# Patient Record
Sex: MALE | Race: WHITE | Employment: OTHER | URBAN - NONMETROPOLITAN AREA
[De-identification: names, ages, dates, MRNs, and addresses within clinical notes are randomized per-mention and may not be internally consistent; named-entity substitution may affect disease eponyms.]

---

## 2021-02-08 ENCOUNTER — HOSPITAL ENCOUNTER (OUTPATIENT)
Dept: PHYSICAL THERAPY | Age: 71
Discharge: HOME | End: 2021-02-08
Payer: MEDICARE

## 2021-02-08 PROCEDURE — 97161 PT EVAL LOW COMPLEX 20 MIN: CPT

## 2021-02-08 NOTE — PROGRESS NOTES
PT INITIAL EVALUATION NOTE - [facility] 2-15    Patient Name: Akilah Das  Date:2021  : 1950  [x]  Patient  Verified  Payor: [facility] MEDICARE / Plan: [address] / Product Type: Managed Care Medicare /    In time: 2:14  Out time: 3:09  Total Treatment Time (min): 55  Total Timed Codes (min): 0  1:1 Treatment Time ( [address] only): 54   Visit #: 1    Treatment Area: Low back pain [M54.5]    SUBJECTIVE  Pain Level (0-10 scale): 3  Any medication changes, allergies to medications, adverse drug reactions, diagnosis change, or new procedure performed?: [] No    [x] Yes (see summary sheet for update)  Subjective:    A couple weeks before , pt complains of pain in L LE around his knee. He states he first noticed the pain when they were helping move furniture. Pt went to family doctor and was treated for sciatica. Pt states the pain traveled from knee to hip then below knee. Since , \"pain would continue to move\". X-rays were taken and then pt saw orthopedic surgeon. The pt states the orthopedist stated he may have DDD of L4-L5 and L5-S1. MD gave pt a list of initial exercises, but pt states pain increased on 3rd day of doing exercises. 2 weeks ago, pt states he was unable to bend forward and put socks on. Recently, pt states pain has been improving. He states he had no pain over the weekend. PLOF: Pt was active and pain free before . Mechanism of Injury: Moving heavy objects. Radiographs: X-rays of L hip and spine. What increases symptoms: Standing for prolonged periods, walking quickly. What decreases symptoms: no pain in sitting and non-weightbearing. PMHx/Surgical Hx: Carpal tunnel, hernia repair, prostate  Work Hx: Retired currently,   Living Situation: 1 story, 4 steps to enter, B railing. Pt Goals:  To be free from pain  Barriers: None  Motivation: good   Substance use: none noted   Cognition: A & O x 4    Fall Assessment: none needed       OBJECTIVE/EXAMINATION  Posture:  Sitting comfortably edge of bed with no apparent complaints of pain or discomfort. Other Observations:    Gait and Functional Mobility:  No significant observations  Palpation: No tenderness noted upon palpation along pt's tibial tuberosity, where pt stated was area with typical pain. Flexibility: 90/90 Hamstring flexibility - -27 degrees B    Neurological: Reflexes / Sensations: Patellar reflexes symmetrical w/ Jendrassik maneuver.             Lumbar AROM:      Flexion             45 degrees      Extension            25 degrees                     R                    L  Side Bending   10 degrees  10 degrees    Rotation   35 degrees  35 degrees      Manual Muscle Testing  Hip Flexion                     5/5                  5/5  Knee Extension             5/5                    5/5  Knee Flexion                 5/5  4+/5  Ankle PF    5/5  5/5  Ankle DF    5/5  5/5        Special Tests:    Slump: -.       Pain Level (0-10 scale) post treatment: 0/10    ASSESSMENT/Changes in Function:     [x]  See Plan of [address] Francoise Coronado PT, DPT 2/8/2021

## 2021-02-08 NOTE — PROGRESS NOTES
[address], [address] [place]  Ph: [phone]    Fax: [phone]    Plan of Care/Statement of Necessity for Physical Therapy Services  2-15    Patient name: Shirin Martin  : 1950  Provider#: [id number]  Referral source: Jannette Melgoza MD      Medical/Treatment Diagnosis: Low back pain [M54.5]     Prior Hospitalization: see medical history     Comorbidities: see medical history  Prior Level of Function: Pt was an unlimited community ambulator  Medications: Verified on Patient Summary List  Start of Care: 2021      Onset Date: 2020   The [address] [place] [address] and following information is based on the information from the initial evaluation. Assessment/ key information: Pt is a 65year old male that presents with signs and symptoms likely consistent with low back dysfunction. Pt is a good candidate for rehab as he is a healthy and active individual who is motivated to return to his PLOF. He would benefit from skilled OP PT to address the stated deficits below and return to his PLOF. Evaluation Complexity History LOW Complexity : Zero comorbidities / personal factors that will impact the outcome / POC; Examination LOW Complexity : 1-2 Standardized tests and measures addressing body structure, function, activity limitation and / or participation in recreation  ;Presentation LOW Complexity : Stable, uncomplicated  ;   Overall Complexity Rating: LOW     Problem List: pain affecting function, decrease strength and decrease activity tolerance   Treatment Plan may include any combination of the following: Therapeutic exercise, Therapeutic activities, Neuromuscular re-education, Physical agent/modality, Gait/balance training, Manual therapy and Patient education  Patient / Family readiness to learn indicated by: asking questions  Persons(s) to be included in education: patient (P)  Barriers to Learning/Limitations: None  Patient Goal (s): I want to be free from this pain  Patient Self Reported Health Status: good  Rehabilitation Potential: good    Short Term Goals: To be accomplished in 6 treatments:  1) Pt will be able to stand for 30 minutes or longer with a pain rating of 5/10 or less in order to safely perform ADLs. 2) Pt will be independent with HEP to continue rehab outside of therapy. Long Term Goals: To be accomplished in 12 treatments:  1) Pt will be able to ambulate for 10 minutes without pain to safely navigate his community. 2) Pt will demonstrate 5/5 L knee flexion MMT to be able to safely move furniture. 3) Pt will report no radiating symptoms in a span of 2 days or greater to demonstrate return to PLOF. Frequency / Duration: Patient to be seen 2 times per week for 12 treatments. Patient/ Caregiver education and instruction: self care    [x]  Plan of care has been reviewed with PTA        Certification Period: 2021 to 2021    Dmitriy Yin PT, DPT 2021     ________________________________________________________________________    I certify that the above Therapy Services are being furnished while the patient is under my care. I agree with the treatment plan and certify that this therapy is necessary.     Physician's Signature:____________________  Date:____________Time: _________    Patient name: Pascual Stafford  : 1950  Provider#: [id number]

## 2021-02-16 ENCOUNTER — HOSPITAL ENCOUNTER (OUTPATIENT)
Dept: PHYSICAL THERAPY | Age: 71
Discharge: HOME | End: 2021-02-16
Payer: MEDICARE

## 2021-02-16 PROCEDURE — 97014 ELECTRIC STIMULATION THERAPY: CPT

## 2021-02-16 PROCEDURE — 97110 THERAPEUTIC EXERCISES: CPT

## 2021-02-16 NOTE — PROGRESS NOTES
PT DAILY TREATMENT NOTE 2-15    Patient Name: Ravi Lugo  Date:2021  : 1950  [x]  Patient  Verified  Payor: [facility] MEDICARE / Plan: [address] / Product Type: [employer] Medicare /    In time:904  Out time:  Total Treatment Time (min): 71  Visit #: 2    Treatment Area: Low back pain [M54.5]    SUBJECTIVE  Pain Level (0-10 scale): 2/10  Any medication changes, allergies to medications, adverse drug reactions, diagnosis change, or new procedure performed?: [x] No    [] Yes (see summary sheet for update)  Subjective functional status/changes:   [] No changes reported  \"I have good days and I have bad days. It hurts so bad to walk for a long time in [employer]. \"    OBJECTIVE    Modality rationale: decrease edema, decrease inflammation, decrease pain, increase tissue extensibility and increase muscle contraction/control to improve the patients ability to complete adl's without pain   Min Type Additional Details      15 [x] Estim: []Att   []Unatt    []TENS instruct                  [x]IFC  []Premod   []NMES                     []Other:  []w/US   []w/ice   [x]w/heat  Position: seated  Location: lower back       []  Traction: [] Cervical       []Lumbar                       [] Prone          []Supine                       []Intermittent   []Continuous Lbs:  [] before manual  [] after manual  []w/heat    []  Ultrasound: []Continuous   [] Pulsed                       at: []1MHz   []3MHz Location:  W/cm2:    [] Paraffin         Location:   []w/heat    []  Ice     []  Heat  []  Ice massage Position:  Location:    []  Laser  []  Other: Position:  Location:      []  Vasopneumatic Device Pressure:       [] lo [] med [] hi   [] w/ ice    Temperature:      [x] Skin assessment post-treatment:  [x]intact []redness- no adverse reaction    []redness  adverse reaction:     55 min Therapeutic Exercise:  [x] See flow sheet :   Rationale: increase ROM, increase strength and improve coordination to improve the patients ability to complete ADLs without pain. With   [x] TE   [] TA   [] neuro   [] other: Patient Education: [] Review HEP    [] Progressed/Changed HEP based on:   [] positioning   [] body mechanics   [] transfers   [] heat/ice application    [x] other: provided HEP     Other Objective/Functional Measures: addition of seated and supine lumbar arom and stretching     Pain Level (0-10 scale) post treatment: 0/10    ASSESSMENT/Changes in Function:   Pt tolerated treatment well. Pt educated on condition and different lumbar positions which may alleviate or increase pain due to nerve compression. Discussion over mechanical traction- may attempt more conservative treatment initially, and if symptoms do not improve, can add in this treatment option. Patient will continue to benefit from skilled PT services to modify and progress therapeutic interventions, address functional mobility deficits, address ROM deficits, address strength deficits, analyze and address soft tissue restrictions, analyze and cue movement patterns and analyze and modify body mechanics/ergonomics to attain remaining goals. [x]  See Plan of Care  []  See progress note/recertification  []  See Discharge Summary         Progress towards goals / Updated goals:  Short Term Goals: To be accomplished in 6 treatments:  1) Pt will be able to stand for 30 minutes or longer with a pain rating of 5/10 or less in order to safely perform ADLs. 2) Pt will be independent with HEP to continue rehab outside of therapy.      Long Term Goals: To be accomplished in 12 treatments:  1) Pt will be able to ambulate for 10 minutes without pain to safely navigate his community. 2) Pt will demonstrate 5/5 L knee flexion MMT to be able to safely move furniture.    3) Pt will report no radiating symptoms in a span of 2 days or greater to demonstrate return to PLOF.        PLAN  [x]  Upgrade activities as tolerated     [x] Continue plan of care  []  Update interventions per flow sheet       []  Discharge due to:_  []  Other:_      Marlena Guzman, PT, DPT 2/16/2021

## 2021-02-18 ENCOUNTER — APPOINTMENT (OUTPATIENT)
Dept: PHYSICAL THERAPY | Age: 71
End: 2021-02-18
Payer: MEDICARE

## 2021-02-23 ENCOUNTER — HOSPITAL ENCOUNTER (OUTPATIENT)
Dept: PHYSICAL THERAPY | Age: 71
Discharge: HOME | End: 2021-02-23
Payer: MEDICARE

## 2021-02-23 PROCEDURE — 97012 MECHANICAL TRACTION THERAPY: CPT

## 2021-02-23 PROCEDURE — 97110 THERAPEUTIC EXERCISES: CPT

## 2021-02-23 PROCEDURE — 97014 ELECTRIC STIMULATION THERAPY: CPT

## 2021-02-23 NOTE — PROGRESS NOTES
PT DAILY TREATMENT NOTE - [facility] 2-15    Patient Name: Park Jonhson  Date:2021  : 1950  [x]  Patient  Verified  Payor: [employer] MEDICARE / Plan: [employer] / Product Type: Managed Care Medicare /    In time:958 am Out time:1103 am  Total Treatment Time (min): 65  Total Timed Codes (min): 50  1:1 Treatment Time ( only): *50  Visit #: 3  Treatment Area: Low back pain [M54.5]    SUBJECTIVE  Pain Level (0-10 scale): 4/10  Any medication changes, allergies to medications, adverse drug reactions, diagnosis change, or new procedure performed?: [x] No    [] Yes (see summary sheet for update)  Subjective functional status/changes:   [] No changes reported  \"Pt reports still having pain in his back with forward bending and increased walking.  Pt also notes that he has been pushing ex to point of .\"    OBJECTIVE    Modality rationale: decrease pain and increase tissue extensibility to improve the patients ability to increase active motion in trunk and hips     Min Type Additional Details      15 [x] Estim: [x]Att   []Unatt    []TENS instruct                  [x]IFC  []Premod   []NMES                    []Other:  []w/US      [x]w/ heat  []w/ ice  Position: supine w/ traction   Location: L LB hip       15 [x]  Traction: [] Cervical       [x]Lumbar                       [] Prone          [x]Supine                       [x]Intermittent   []Continuous Lbs: 45/23  [] before manual  [] after manual  [x] w/ heat  [x] Simultaneously performed with w/ Estim    []  Ultrasound: []Continuous   [] Pulsed                       at: []1MHz   []3MHz Location:  W/cm2:    [] Paraffin         Location:   []w/heat   15 []  Ice     [x]  Heat  []  Ice massage Position: supine   Location: across LB w/ ES and TX    []  Laser  []  Other: Position:  Location:      []  Vasopneumatic Device Pressure:       [] lo [] med [] hi   [] w/ ice      Temperature:   [] Simultaneously performed with w/ Estim [x] Skin assessment post-treatment:  [x]intact []redness- no adverse reaction     []redness  adverse reaction:     50 min Therapeutic Exercise:  [x] See flow sheet :   Rationale: increase ROM, increase strength and improve coordination to improve the patients ability to increase functional motion with no pain in Legs and back during WB and bending . With   [] TE   [] TA   [] neuro   [] other: Patient Education: [x] Review HEP    [] Progressed/Changed HEP based on:   [] positioning   [] body mechanics   [] transfers   [] heat/ice application    [] other:          Pain Level (0-10 scale) post treatment: 0/10    ASSESSMENT/Changes in Function:   The pt tolerated treatment session with the addition of motilities, traction with e-stim and MHP. Pt tolerated all ex with no c/o and instruction to ease up pressure to feel stretch not hurt with stretch. Pt notes compliance with HEP however doing too much and instructed to reduce ex. Patient will continue to benefit from skilled PT services to modify and progress therapeutic interventions, address functional mobility deficits, address ROM deficits, address strength deficits, analyze and address soft tissue restrictions, analyze and cue movement patterns and analyze and modify body mechanics/ergonomics to attain remaining goals     [x]  See Plan of Care  []  See progress note/recertification  []  See Discharge Summary         Progress towards goals / Updated goals:  Short Term Goals: To be accomplished in 6 treatments:  1) Pt will be able to stand for 30 minutes or longer with a pain rating of 5/10 or less in order to safely perform ADLs. 2) Pt will be independent with HEP to continue rehab outside of therapy.      Long Term Goals: To be accomplished in 12 treatments:  1) Pt will be able to ambulate for 10 minutes without pain to safely navigate his community. 2) Pt will demonstrate 5/5 L knee flexion MMT to be able to safely move furniture.    3) Pt will report no radiating symptoms in a span of 2 days or greater to demonstrate return to PLOF. PLAN  [x]  Upgrade activities as tolerated     [x]  Continue plan of care  []  Update interventions per flow sheet       []  Discharge due to:_  []  Other:_      Kecia Noriega 2/23/2021

## 2021-02-25 ENCOUNTER — HOSPITAL ENCOUNTER (OUTPATIENT)
Dept: PHYSICAL THERAPY | Age: 71
Discharge: HOME | End: 2021-02-25
Payer: MEDICARE

## 2021-02-25 PROCEDURE — 97110 THERAPEUTIC EXERCISES: CPT

## 2021-02-25 PROCEDURE — 97014 ELECTRIC STIMULATION THERAPY: CPT

## 2021-02-25 PROCEDURE — 97012 MECHANICAL TRACTION THERAPY: CPT

## 2021-02-25 NOTE — PROGRESS NOTES
PT DAILY TREATMENT NOTE - [facility] 2-15    Patient Name: Nadya Arthur  Date:2021  : 1950  [x]  Patient  Verified  Payor: [facility] MEDICARE / Plan: [address] / Product Type: Managed Care Medicare /    In time:9:51  Out time:11:00  Total Treatment Time (min): 69  Total Timed Codes (min): 49  1:1 Treatment Time ( [address] only): 71   Visit #:  4    Treatment Area: Low back pain [M54.5]    SUBJECTIVE  Pain Level (0-10 scale): 2/10  Any medication changes, allergies to medications, adverse drug reactions, diagnosis change, or new procedure performed?: [x] No    [] Yes (see summary sheet for update)  Subjective functional status/changes:   [] No changes reported  \"I think traction helped last time. I was able to walk all the way to my car without limping. I'm not where I want to be yet but I'm definitely noticing improvement. \"    OBJECTIVE    Modality rationale: decrease pain to improve the patients ability to ambulate within his community   Min Type Additional Details      20 [x] Estim: []Att   [x]Unatt    []TENS instruct                  [x]IFC  []Premod   []NMES                    []Other:  []w/US      [x]w/ heat  []w/ ice  Position: supine  Location: low back      20 [x]  Traction: [] Cervical       [x]Lumbar                       [] Prone          []Supine                       []Intermittent   []Continuous Lbs: 50/25  [] before manual  [] after manual  [x] w/ heat  [x] Simultaneously performed with w/ Estim    []  Ultrasound: []Continuous   [] Pulsed                       at: []1MHz   []3MHz Location:  W/cm2:    [] Paraffin         Location:   []w/heat    []  Ice     []  Heat  []  Ice massage Position:  Location:    []  Laser  []  Other: Position:  Location:      []  Vasopneumatic Device Pressure:       [] lo [] med [] hi   [] w/ ice      Temperature:   [] Simultaneously performed with w/ Estim     [x] Skin assessment post-treatment:  [x]intact []redness- no adverse reaction     []redness  adverse reaction:     49 min Therapeutic Exercise:  [] See flow sheet :   Rationale: increase ROM and increase strength to improve the patients ability to ambulate without pain    Other Objective/Functional Measures:   Instructed pt in Paloff Press to increase isometric core strength. Instructed pt in leg press to improve strength and safety with squatting. Pain Level (0-10 scale) post treatment: 0/10    ASSESSMENT/Changes in Function:   The pt tolerated treatment well. He demonstrates a good understanding of exercises and requires minimal VC to perform them properly. He continues to demonstrate hamstring and glute tightness during stretches and also complains of radiating pain during figure 4 stretch on the L LE. Pt reports good results from initial traction at previous treatment therefore traction weight parameters were increased slightly to progress. Pt can continue to progress in exercises and resistance as tolerated. Patient will continue to benefit from skilled PT services to modify and progress therapeutic interventions, address functional mobility deficits, address ROM deficits, address strength deficits, analyze and modify body mechanics/ergonomics and assess and modify postural abnormalities to attain remaining goals     [x]  See Plan of Care  []  See progress note/recertification  []  See Discharge Summary         Progress towards goals / Updated goals:  Short Term Goals: To be accomplished in 6 treatments:  1) Pt will be able to stand for 30 minutes or longer with a pain rating of 5/10 or less in order to safely perform ADLs. In progress  2) Pt will be independent with HEP to continue rehab outside of therapy. In [address] be accomplished in 12 treatments:  1) Pt will be able to ambulate for 10 minutes without pain to safely navigate his community. In Progress   2) Pt will demonstrate 5/5 L knee flexion MMT to be able to safely move furniture.  In Progress  3) Pt will report no radiating symptoms in a span of 2 days or greater to demonstrate return to PLOF.  In Progress      PLAN  [x]  Upgrade activities as tolerated     [x]  Continue plan of care  [x]  Update interventions per flow sheet       []  Discharge due to:_  []  Other:_      Daryl Blair, PT, DPT 2/25/2021

## 2021-03-02 ENCOUNTER — HOSPITAL ENCOUNTER (OUTPATIENT)
Dept: PHYSICAL THERAPY | Age: 71
Discharge: HOME | End: 2021-03-02
Payer: MEDICARE

## 2021-03-02 PROCEDURE — 97110 THERAPEUTIC EXERCISES: CPT

## 2021-03-02 PROCEDURE — 97014 ELECTRIC STIMULATION THERAPY: CPT

## 2021-03-02 PROCEDURE — 97012 MECHANICAL TRACTION THERAPY: CPT

## 2021-03-02 NOTE — PROGRESS NOTES
PT DAILY TREATMENT NOTE - [facility] 2-15    Patient Name: Lion Avila  Date:3/2/2021  : 1950  [x]  Patient  Verified  Payor: [place] [employer] MEDICARE / Plan: [employer] / Product Type: Managed Care Medicare /    In time: 9:54  Out time:11:00  Total Treatment Time (min): 66  Total Timed Codes (min): 48  1:1 Treatment Time ( [address] only): 48   Visit #: 5    Treatment Area: Low back pain [M54.5]    SUBJECTIVE  Pain Level (0-10 scale): 1/10  Any medication changes, allergies to medications, adverse drug reactions, diagnosis change, or new procedure performed?: [x] No    [] Yes (see summary sheet for update)  Subjective functional status/changes:   [] No changes reported  Pt reports he notices an improvement in his pain, but continues to have issues with standing and walking for extended periods of time. OBJECTIVE    Modality rationale: decrease pain and increase tissue extensibility to improve the patients ability to perform ADLs with reduced pain.    Min Type Additional Details      18 [x] Estim: []Att   [x]Unatt    []TENS instruct                  [x]IFC  []Premod   []NMES                    []Other:  []w/US      [x]w/ heat  []w/ ice  Position: Supine  Location: Over lumbar region  (Performed simultaneously with mechanical traction)      18 [x]  Traction: [] Cervical       [x]Lumbar                       [] Prone          [x]Supine                       [x]Intermittent   []Continuous Lbs: 60# max/30 # min  [] before manual  [] after manual  [x] w/ heat  [x] Simultaneously performed with w/ Estim    []  Ultrasound: []Continuous   [] Pulsed                       at: []1MHz   []3MHz Location:  W/cm2:    [] Paraffin         Location:   []w/heat   18 []  Ice     [x]  Heat  []  Ice massage Position: Supine  Location: Over lumbar region    []  Laser  []  Other: Position:  Location:      []  Vasopneumatic Device Pressure:       [] lo [] med [] hi   [] w/ ice      Temperature:   [] Simultaneously performed with w/ Estim     [x] Skin assessment post-treatment:  [x]intact [x]redness- no adverse reaction     []redness - adverse reaction:     48 min Therapeutic Exercise:  [x] See flow sheet :   Rationale: increase ROM and increase strength to improve the patient’s ability to perform ADLs.    With   [x] TE   [] TA   [] neuro   [] other: Patient Education: [x] Review HEP    [] Progressed/Changed HEP based on:   [] positioning   [] body mechanics   [] transfers   [] heat/ice application    [] other:      Other Objective/Functional Measures: Increased max and min weight for mechanical traction to facilitate further pain relief. Added TA draw for further progression with core stability.    Pain Level (0-10 scale) post treatment: 0/10    ASSESSMENT/Changes in Function:   The pt tolerated treatment well. Pt is steadily progressing with flexibility and core stability exercises, but continues to be limited by pain with prolonged standing and walking. Pt requires verbal cueing for proper performance of exercises and education to prevent overstretching. Continue to progress as able. Patient will continue to benefit from skilled PT services to modify and progress therapeutic interventions, address functional mobility deficits, address ROM deficits, address strength deficits, analyze and address soft tissue restrictions, analyze and cue movement patterns, analyze and modify body mechanics/ergonomics and assess and modify postural abnormalities to attain remaining goals     [x]  See Plan of Care  []  See progress note/recertification  []  See Discharge Summary         Progress towards goals / Updated goals:  Short Term Goals: To be accomplished in 6 treatments:  1) Pt will be able to stand for 30 minutes or longer with a pain rating of 5/10 or less in order to safely perform ADLs. In progress  2) Pt will be independent with HEP to continue rehab outside of therapy. In Progress     Long Term Goals: To be  accomplished in 12 treatments:  1) Pt will be able to ambulate for 10 minutes without pain to safely navigate his community. In Progress   2) Pt will demonstrate 5/5 L knee flexion MMT to be able to safely move furniture. In Progress  3) Pt will report no radiating symptoms in a span of 2 days or greater to demonstrate return to PLOF.  In Progress      PLAN  [x]  Upgrade activities as tolerated     [x]  Continue plan of care  []  Update interventions per flow sheet       []  Discharge due to:_  []  Other:_      Jacobo Goodrich, PT, DPT 3/2/2021

## 2021-03-04 ENCOUNTER — HOSPITAL ENCOUNTER (OUTPATIENT)
Dept: PHYSICAL THERAPY | Age: 71
Discharge: HOME | End: 2021-03-04
Payer: MEDICARE

## 2021-03-04 PROCEDURE — 97012 MECHANICAL TRACTION THERAPY: CPT

## 2021-03-04 PROCEDURE — 97014 ELECTRIC STIMULATION THERAPY: CPT

## 2021-03-04 PROCEDURE — 97110 THERAPEUTIC EXERCISES: CPT

## 2021-03-04 NOTE — PROGRESS NOTES
PT DAILY TREATMENT NOTE - [facility] 2-15    Patient Name: Lion Avila  Date:3/4/2021  : 1950  [x]  Patient  Verified  Payor: [employer]ga [employer] / Plan: [employer] / Product Type: Managed Care Medicare /    In time: 5  Out time: 1110  Total Treatment Time (min):  78  Total Timed Codes (min): 78  1:1 Treatment Time ( only): 78   Visit #: 6    Treatment Area: Low back pain [M54.5]    SUBJECTIVE  Pain Level (0-10 scale): 1/10  Any medication changes, allergies to medications, adverse drug reactions, diagnosis change, or new procedure performed?: [x] No    [] Yes (see summary sheet for update)  Subjective functional status/changes:   [] No changes reported  \"My symptoms have definitely improved since I started coming, but I still have soreness. I can walk for longer periods and can go shopping, but I do still feel it in my legs. I do feel something \"not right\" in my legs the more I walk. \"    OBJECTIVE  Posture:  Sitting comfortably edge of bed with no apparent complaints of pain or discomfort.   Gait and Functional Mobility:  No significant observations  Palpation: No tenderness noted upon palpation along pt's tibial tuberosity, where pt stated was area with typical pain.     Flexibility: 90/90 Hamstring flexibility -35L;  -40R degrees                                                           Lumbar AROM:                                               Flexion                                              70 degrees                                               Extension                                         25 degrees                                                                                                      R                                 L  Side Bending                           20 degrees                  25 degrees                    Rotation                                   30 degrees                  40 degrees                       Manual Muscle Testing  Hip Flexion                                          5/5                  5/5  Knee Extension                                  5/5                    5/5  Knee Flexion                                       5/5                   5/5  Ankle PF                                             5/5                   5/5  Ankle DF                                             5/5                   5/5                                                    Special Tests:               Slump: -.         Modality rationale: decrease pain and increase tissue extensibility to improve the patients ability to perform ADLs with reduced pain. Min Type Additional Details      18 [x] Estim: []Att   [x]Unatt    []TENS instruct                  [x]IFC  []Premod   []NMES                    []Other:  []w/US      [x]w/ heat  []w/ ice  Position: Supine  Location: Over lumbar region  (Performed simultaneously with mechanical traction)      18 [x]  Traction: [] Cervical       [x]Lumbar                       [] Prone          [x]Supine                       [x]Intermittent   []Continuous Lbs: 60# max/30 # min  [] before manual  [] after manual  [x] w/ heat  [x] Simultaneously performed with w/ Estim    []  Ultrasound: []Continuous   [] Pulsed                       at: []1MHz   []3MHz Location:  W/cm2:    [] Paraffin         Location:   []w/heat   18 []  Ice     [x]  Heat  []  Ice massage Position: Supine  Location: Over lumbar region    []  Laser  []  Other: Position:  Location:      []  Vasopneumatic Device Pressure:       [] lo [] med [] hi   [] w/ ice      Temperature:   [] Simultaneously performed with w/ Estim     [x] Skin assessment post-treatment:  [x]intact [x]redness- no adverse reaction     []redness - adverse reaction:     48 min Therapeutic Exercise:  [x] See flow sheet :   Rationale: increase ROM and increase strength to improve the patients ability to perform ADLs.     With   [x] TE   [] TA   [] neuro   [] other: Patient Education: [x] Review HEP    [] Progressed/Changed HEP based on:   [] positioning   [] body mechanics   [] transfers   [] heat/ice application    [] other:      Other Objective/Functional Measures: No change in mechanical traction today due to pt increased pain post last treatment session. Pain Level (0-10 scale) post treatment: 0/10    ASSESSMENT/Changes in Function:   Pt has received treatment including lumbopelvic and lower extremity flexibility, ROM, strengthening, pelvic and core stabilization, functional activities, and education in posture and body mechanics. Pt has demonstrated overall progress in TUG test, rom, strength, pain level, and functional activites. Pt continues to present with functional deficits including radiation of symptoms and functional deficits. Pt would benefit from further skilled physical therapy interventions to continue to progress range of motion, strength, and balance, allowing for improved function. The pt tolerated treatment well. Pt is steadily progressing with flexibility and core stability exercises, but continues to be limited by pain with prolonged standing and walking. Pt requires verbal cueing for proper performance of exercises and education to prevent overstretching. Continue to progress as able.  Patient will continue to benefit from skilled PT services to modify and progress therapeutic interventions, address functional mobility deficits, address ROM deficits, address strength deficits, analyze and address soft tissue restrictions, analyze and cue movement patterns, analyze and modify body mechanics/ergonomics and assess and modify postural abnormalities to attain remaining goals     [x]  See Plan of Care  []  See progress note/recertification  []  See Discharge Summary         Progress towards goals / Updated goals:  Short Term Goals: To be accomplished in 6 treatments:  1) Pt will be able to stand for 30 minutes or longer with a pain rating of 5/10 or less in order to safely perform ADLs. Met  2) Pt will be independent with HEP to continue rehab outside of therapy. Met     Long Term Goals: To be accomplished in 12 treatments:  1) Pt will be able to ambulate for 10 minutes without pain to safely navigate his community. Met   2) Pt will demonstrate 5/5 L knee flexion MMT to be able to safely move furniture. Met  3) Pt will report no radiating symptoms in a span of 2 days or greater to demonstrate return to PLOF.  In Progress      PLAN  [x]  Upgrade activities as tolerated     [x]  Continue plan of care  []  Update interventions per flow sheet       []  Discharge due to:_  []  Other:_      Aren Silverman, PT, DPT 3/4/2021

## 2021-03-04 NOTE — PROGRESS NOTES
[address]  [place], [place]  Ph: [phone]    Fax: [phone]    Progress Note    Name: Lubna Rivers   : 1950   MD: Chris Carballo MD       Treatment Diagnosis: Low back pain [M54.5]  Start of Care: 2021    Visits from Start of Care: 6   Missed Visits: 0    Summary of Care / Assessment / Recommendations:   Pt has received treatment including lumbopelvic and lower extremity flexibility, ROM, strengthening, pelvic and core stabilization, functional activities, and education in posture and body mechanics. Pt has demonstrated overall progress in TUG test, rom, strength, pain level, and functional activites. Pt continues to present with functional deficits including radiation of symptoms and functional deficits. Pt would benefit from further skilled physical therapy interventions to continue to progress range of motion, strength, and balance, allowing for improved function. Progress towards goals / Updated goals:  Short Term Goals: To be accomplished in 6 treatments:  1) Pt will be able to stand for 30 minutes or longer with a pain rating of 5/10 or less in order to safely perform ADLs. Met  2) Pt will be independent with HEP to continue rehab outside of therapy. Met     Long Term Goals: To be accomplished in 12 treatments:  1) Pt will be able to ambulate for 10 minutes without pain to safely navigate his community. Met   2) Pt will demonstrate 5/5 L knee flexion MMT to be able to safely move furniture. Met  3) Pt will report no radiating symptoms in a span of 2 days or greater to demonstrate return to PLOF. In Progress     Frequency/Duration:  2 treatments per week, for 5 weeks. Re Kenyon, PT, DPT 3/4/2021         ________________________________________________________________________  NOTE TO PHYSICIAN:  Please complete the following and fax to:  [facility]:   Fax: [phone]  .  Retain this original for your records. If you are unable to process this request in 24 hours, please contact our office.        ____ I have read the above report and request that my patient continue therapy with the following changes/special instructions:  ____ I have read the above report and request that my patient be discharged from therapy    Physician's Signature:_________________ Date:___________Time:__________

## 2021-03-09 ENCOUNTER — HOSPITAL ENCOUNTER (OUTPATIENT)
Dept: PHYSICAL THERAPY | Age: 71
Discharge: HOME | End: 2021-03-09
Payer: MEDICARE

## 2021-03-09 PROCEDURE — 97014 ELECTRIC STIMULATION THERAPY: CPT

## 2021-03-09 PROCEDURE — 97012 MECHANICAL TRACTION THERAPY: CPT

## 2021-03-09 PROCEDURE — 97110 THERAPEUTIC EXERCISES: CPT

## 2021-03-09 NOTE — PROGRESS NOTES
PT DAILY TREATMENT NOTE - [facility] -15    Patient Name: Cristian Vargas  Date:3/9/2021  : 1950  [x]  Patient  Verified  Payor: [place] [employer] MEDICARE / Plan: [employer] / Product Type: Managed Care Medicare /    In time:9:53  Out time:10:58  Total Treatment Time (min): 65  Total Timed Codes (min): 65  1:1 Treatment Time ([facility] only): 65   Visit #: 7    Treatment Area: Low back pain [M54.5]    SUBJECTIVE  Pain Level (0-10 scale): 0/10  Any medication changes, allergies to medications, adverse drug reactions, diagnosis change, or new procedure performed?: [x] No    [] Yes (see summary sheet for update)  Subjective functional status/changes:   [] No changes reported  Pt reports he is not having any pain, but his biggest issue right now is walking for more than 10 minutes before his legs feel \"tired. \"    OBJECTIVE    Modality rationale: decrease pain and increase tissue extensibility to improve the patients ability to perform ADLs with reduced pain.    Min Type Additional Details      18 [x] Estim: []Att   [x]Unatt    []TENS instruct                  [x]IFC  []Premod   []NMES                    []Other:  []w/US      [x]w/ heat  []w/ ice  Position: Supine  Location: Over lumbar region      18 [x]  Traction: [] Cervical       [x]Lumbar                       [] Prone          [x]Supine                       [x]Intermittent   []Continuous Lbs: 65# max/35# min  [] before manual  [] after manual  [x] w/ heat  [x] Simultaneously performed with w/ Estim    []  Ultrasound: []Continuous   [] Pulsed                       at: []1MHz   []3MHz Location:  W/cm2:    [] Paraffin         Location:   []w/heat   18 []  Ice     [x]  Heat  []  Ice massage Position: Supine  Location: Over lumbar region    []  Laser  []  Other: Position:  Location:      []  Vasopneumatic Device Pressure:       [] lo [] med [] hi   [] w/ ice      Temperature:   [] Simultaneously performed with w/ Estim     [x] Skin assessment post-treatment:  [x]intact [x]redness- no adverse reaction     []redness - adverse reaction:     47 min Therapeutic Exercise:  [x] See flow sheet :   Rationale: increase ROM and increase strength to improve the patients ability to improve functional mobility with reduced pain. With   [x] TE   [] TA   [] neuro   [] other: Patient Education: [x] Review HEP    [] Progressed/Changed HEP based on:   [] positioning   [] body mechanics   [] transfers   [] heat/ice application    [] other:      Other Objective/Functional Measures: Increased resistance with leg press for LE strengthening. Added TA draw with march for core stability. Increased maximum and minimum weight for mechanical traction due to improved tolerance from last visit to facilitate centralization of symptoms. Pain Level (0-10 scale) post treatment: 0/10    ASSESSMENT/Changes in Function:   The pt tolerated treatment well. Pt is steadily progressing with flexibility and core stability exercises, but continues to be limited by pain and LE weakness with prolonged walking. Pt requires verbal cueing for proper performance of stretches and stability exercises to prevent overstretching as well as maintaining TA contraction with marches. Continue to progress as able with core stability exercises as well as LE strength and endurance. Patient will continue to benefit from skilled PT services to modify and progress therapeutic interventions, address functional mobility deficits, address ROM deficits, address strength deficits, analyze and address soft tissue restrictions, analyze and cue movement patterns, analyze and modify body mechanics/ergonomics and assess and modify postural abnormalities to attain remaining goals.      [x]  See Plan of Care  []  See progress note/recertification  []  See Discharge Summary         Progress towards goals / Updated goals:  Short Term Goals: To be accomplished in 6 treatments:  1) Pt will be able to stand for 30 minutes or longer with a pain rating of 5/10 or less in order to safely perform ADLs.  Met  2) Pt will be independent with HEP to continue rehab outside of therapy. Met     Long Term Goals: To be accomplished in 12 treatments:  1) Pt will be able to ambulate for 10 minutes without pain to safely navigate his community. Met   2) Pt will demonstrate 5/5 L knee flexion MMT to be able to safely move furniture. Met  3) Pt will report no radiating symptoms in a span of 2 days or greater to demonstrate return to PLOF. In Progress     PLAN  [x]  Upgrade activities as tolerated     [x]  Continue plan of care  []  Update interventions per flow sheet       []  Discharge due to:_  []  Other:_      Ron Person, PT, DPT 3/9/2021

## 2021-03-11 ENCOUNTER — HOSPITAL ENCOUNTER (OUTPATIENT)
Dept: PHYSICAL THERAPY | Age: 71
Discharge: HOME | End: 2021-03-11
Payer: MEDICARE

## 2021-03-11 PROCEDURE — 97014 ELECTRIC STIMULATION THERAPY: CPT

## 2021-03-11 PROCEDURE — 97110 THERAPEUTIC EXERCISES: CPT

## 2021-03-11 PROCEDURE — 97012 MECHANICAL TRACTION THERAPY: CPT

## 2021-03-11 NOTE — PROGRESS NOTES
PT DAILY TREATMENT NOTE - [facility] -15    Patient Name: Derik Stiles  Date:3/11/2021  : 1950  [x]  Patient  Verified  Payor: [place] [employer] MEDICARE / Plan: [employer] / Product Type: Managed Care Medicare /    In time:  Out time:1105  Total Treatment Time (min): 63  Total Timed Codes (min): 63  1:1 Treatment Time ( [address] only): 63   Visit #: 8    Treatment Area: Low back pain [M54.5]    SUBJECTIVE  Pain Level (0-10 scale): 2/10  Any medication changes, allergies to medications, adverse drug reactions, diagnosis change, or new procedure performed?: [x] No    [] Yes (see summary sheet for update)  Subjective functional status/changes:   [] No changes reported  \"I was really sore after my last treatment. I know they increased 3 different things. \"    OBJECTIVE    Modality rationale: decrease pain and increase tissue extensibility to improve the patients ability to perform ADLs with reduced pain.    Min Type Additional Details      18 [x] Estim: []Att   [x]Unatt    []TENS instruct                  [x]IFC  []Premod   []NMES                    []Other:  []w/US      [x]w/ heat  []w/ ice  Position: Supine  Location: Over lumbar region      18 [x]  Traction: [] Cervical       [x]Lumbar                       [] Prone          [x]Supine                       [x]Intermittent   []Continuous Lbs: 65# max/35# min  [] before manual  [] after manual  [x] w/ heat  [x] Simultaneously performed with w/ Estim    []  Ultrasound: []Continuous   [] Pulsed                       at: []1MHz   []3MHz Location:  W/cm2:    [] Paraffin         Location:   []w/heat   18 []  Ice     [x]  Heat  []  Ice massage Position: Supine  Location: Over lumbar region    []  Laser  []  Other: Position:  Location:      []  Vasopneumatic Device Pressure:       [] lo [] med [] hi   [] w/ ice      Temperature:   [] Simultaneously performed with w/ Estim     [x] Skin assessment post-treatment:  [x]intact [x]redness- no adverse reaction     []redness - adverse reaction:     47 min Therapeutic Exercise:  [x] See flow sheet :   Rationale: increase ROM and increase strength to improve the patients ability to improve functional mobility with reduced pain. With   [x] TE   [] TA   [] neuro   [] other: Patient Education: [x] Review HEP    [] Progressed/Changed HEP based on:   [] positioning   [] body mechanics   [] transfers   [] heat/ice application    [] other:      Other Objective/Functional Measures: No increased in treatment due to fair/poor tolerance after last treatment session. Pain Level (0-10 scale) post treatment: 0/10    ASSESSMENT/Changes in Function:   The pt tolerated treatment well. Pt is steadily progressing with flexibility and core stability exercises, but continues to be limited by pain and LE weakness with prolonged walking. Pt consistently appears to tolerate increases in lumbar traction poorly the following day, so may consider making increases less poundage for better tolerance. Continue to progress as able with core stability exercises as well as LE strength and endurance pending pts tolerance to today's treatment. Patient will continue to benefit from skilled PT services to modify and progress therapeutic interventions, address functional mobility deficits, address ROM deficits, address strength deficits, analyze and address soft tissue restrictions, analyze and cue movement patterns, analyze and modify body mechanics/ergonomics and assess and modify postural abnormalities to attain remaining goals. [x]  See Plan of Care  []  See progress note/recertification  []  See Discharge Summary         Progress towards goals / Updated goals:  Short Term Goals: To be accomplished in 6 treatments:  1) Pt will be able to stand for 30 minutes or longer with a pain rating of 5/10 or less in order to safely perform ADLs.  Met  2) Pt will be independent with HEP to continue rehab outside of therapy. Met     Long Term Goals: To be accomplished in 12 treatments:  1) Pt will be able to ambulate for 10 minutes without pain to safely navigate his community. Met   2) Pt will demonstrate 5/5 L knee flexion MMT to be able to safely move furniture. Met  3) Pt will report no radiating symptoms in a span of 2 days or greater to demonstrate return to PLOF. In Progress     PLAN  [x]  Upgrade activities as tolerated     [x]  Continue plan of care  []  Update interventions per flow sheet       []  Discharge due to:_  []  Other:_      Lendel Nissen, PT, DPT 3/11/2021

## 2021-03-16 ENCOUNTER — HOSPITAL ENCOUNTER (OUTPATIENT)
Dept: PHYSICAL THERAPY | Age: 71
Discharge: HOME | End: 2021-03-16
Payer: MEDICARE

## 2021-03-16 PROCEDURE — 97014 ELECTRIC STIMULATION THERAPY: CPT

## 2021-03-16 PROCEDURE — 97012 MECHANICAL TRACTION THERAPY: CPT

## 2021-03-16 PROCEDURE — 97110 THERAPEUTIC EXERCISES: CPT

## 2021-03-16 NOTE — PROGRESS NOTES
PT DAILY TREATMENT NOTE - [facility] 2-15    Patient Name: Park Johnson  Date:3/16/2021  : 1950  [x]  Patient  Verified  Payor: [place] / Plan: [email or web address] / Product Type: Managed Care Medicare /    In time:09:50 AM  Out time: 10:53 AM  Total Treatment Time (min): 63  Total Timed Codes (min):45  1:1 Treatment Time ( [address] only): 39   Visit #:  9    Treatment Area: Low back pain [M54.5]    SUBJECTIVE  Pain Level (0-10 scale): 1  Any medication changes, allergies to medications, adverse drug reactions, diagnosis change, or new procedure performed?: [x] No    [] Yes (see summary sheet for update)  Subjective functional status/changes:   [] No changes reported    Doing Much better than when I first came. I can go a few days without pain. I Do not have that morning pain like before. I can at least put my shoes and socks without my wife assistance, sleep and walk better. My worse problem over the last few week is when I go into [facility] and walk after 10 minutes constantly my legs feel heavy and tingling. If I stop and take a break then I can start again. OBJECTIVE    Modality rationale: increase tissue extensibility and increase muscle contraction/control to improve the patients ability to increase walking distance.    Min Type Additional Details       [x] Estim: []Att   [x]Unatt    []TENS instruct                  [x]IFC  []Premod   []NMES                     []Other: Applied with traction  []w/US   []w/ice   [x]w/heat  Position: supine  Location: low back      18 [x]  Traction: [] Cervical       [x]Lumbar                       [] Prone          [x]Supine                       [x]Intermittent   []Continuous Lbs:65 max and 35 minimal  [] before manual  [] after manual  []w/heat    []  Ultrasound: []Continuous   [] Pulsed                       at: []1MHz   []3MHz Location:  W/cm2:    [] Paraffin         Location:   []w/heat    []  Ice     []  Heat  []  Ice massage Position:  Location:    []  Laser  []  Other: Position:  Location:      []  Vasopneumatic Device Pressure:       [] lo [] med [] hi   Temperature:      [x] Skin assessment post-treatment:  [x]intact []redness- no adverse reaction    []redness - adverse reaction:     45 min Therapeutic Exercise:  [x] See flow sheet :   Rationale: increase ROM, increase strength, improve coordination, improve balance and increase proprioception to improve the patients ability to return to activities of daily living. With   [] TE   [] TA   [] Neuro   [] SC   [] other: Patient Education: [x] Review HEP    [] Progressed/Changed HEP based on:   [] positioning   [] body mechanics   [] transfers   [] heat/ice application    [] other:        Pain Level (0-10 scale) post treatment: 0  ASSESSMENT/Changes in Function:   Patient is doing well with his lumbar exercise program and performing them on a regular basis. He is quite aware of his limitations and adjust himself as needed. His goals have been met and patient is satisfied with his plan of care. Patient has met his goals. Patient will continue to benefit from skilled PT services to address ROM deficits, address strength deficits and analyze and cue movement patterns to attain remaining goals. [x]  See Plan of Care  []  See progress note/recertification  []  See Discharge Summary                   Progress towards goals / Updated goals:  Short Term Goals: To be accomplished in 6 treatments:  1) Pt will be able to stand for 30 minutes or longer with a pain rating of 5/10 or less in order to safely perform ADLs.  Met  2) Pt will be independent with HEP to continue rehab outside of therapy. Met     Long Term Goals: To be accomplished in 12 treatments:  1) Pt will be able to ambulate for 10 minutes without pain to safely navigate his community. Met   2) Pt will demonstrate 5/5 L knee flexion MMT to be able to safely move furniture. Met  3) Pt will report no radiating symptoms in a span of 2 days or greater to demonstrate return to [facility] [place] [place]    PLAN  []  Upgrade activities as tolerated     [x]  Continue plan of care  []  Update interventions per flow sheet       []  Discharge due to:_  []  Other:_      Tyler Young, PT 3/16/2021

## 2021-03-18 ENCOUNTER — HOSPITAL ENCOUNTER (OUTPATIENT)
Dept: PHYSICAL THERAPY | Age: 71
Discharge: HOME | End: 2021-03-18
Payer: MEDICARE

## 2021-03-18 PROCEDURE — 97014 ELECTRIC STIMULATION THERAPY: CPT

## 2021-03-18 PROCEDURE — 97012 MECHANICAL TRACTION THERAPY: CPT

## 2021-03-18 PROCEDURE — 97110 THERAPEUTIC EXERCISES: CPT

## 2021-03-18 NOTE — PROGRESS NOTES
PT DAILY TREATMENT NOTE - [facility] 2-15    Patient Name: Jon Duron  Date:3/18/2021  : 1950  [x]  Patient  Verified  Payor: [facility] MEDICARE / Plan: [employer] / Product Type: Managed Care Medicare /    In time:955 am  Out time:1100 am  Total Treatment Time (min): 65  Total Timed Codes (min): 50  1:1 Treatment Time ( [address] only): 50   Visit #:  10    Treatment Area: Low back pain [M54.5]    SUBJECTIVE  Pain Level (0-10 scale): 2 -1 /10  Any medication changes, allergies to medications, adverse drug reactions, diagnosis change, or new procedure performed?: [x] No    [] Yes (see summary sheet for update)  Subjective functional status/changes:   [] No changes reported  \"PT reports that he is more sore then painful now however when he got out of bed this am he was really painful from hanging pictures on the walls yesterday. \"    OBJECTIVE    Modality rationale: decrease inflammation, decrease pain and increase tissue extensibility to improve the patients ability to increase functional back motion    Min Type Additional Details   15 [x] Estim: []Att   [x]Unatt    []TENS instruct                  [x]IFC  []Premod   []NMES                    []Other:  []w/US      [x]w/ heat  []w/ ice  Position: supine with traction Location: L low back/hip      15 [x]  Traction: [] Cervical       [x]Lumbar                       [] Prone          [x]Supine                       [x]Intermittent   []Continuous Lbs: 68/38  [] before manual  [] after manual  [x] w/ heat  [x] Simultaneously performed with w/ Estim    []  Ultrasound: []Continuous   [] Pulsed                       at: []1MHz   []3MHz Location:  W/cm2:    [] Paraffin         Location:   []w/heat    []  Ice     []  Heat  []  Ice massage Position:  Location:    []  Laser  []  Other: Position:  Location:      []  Vasopneumatic Device Pressure:       [] lo [] med [] hi   [] w/ ice      Temperature:   [] Simultaneously performed with w/ Estim     [x] Skin assessment post-treatment:  [x]intact []redness- no adverse reaction     []redness - adverse reaction:     50 min Therapeutic Exercise:  [x] See flow sheet :   Rationale: increase ROM, increase strength, improve coordination and improve balance to improve the patients ability to increase functional activity and ability to preform daily tasks. With   [] TE   [] TA   [] neuro   [] other: Patient Education: [x] Review HEP    [] Progressed/Changed HEP based on:   [] positioning   [] body mechanics   [] transfers   [] heat/ice application    [] other:          Pain Level (0-10 scale) post treatment: 0/10    ASSESSMENT/Changes in Function:   The pt tolerated treatment session with review of HEP and modification to ex to allow pt to perform a few stretches prior to getting out of bed in the am and possibly reduce morning pain. Pt notes compliance with HEP. Patient will continue to benefit from skilled PT services to modify and progress therapeutic interventions, address functional mobility deficits, address ROM deficits, address strength deficits, analyze and address soft tissue restrictions, analyze and cue movement patterns, analyze and modify body mechanics/ergonomics and assess and modify postural abnormalities to attain remaining goals     [x]  See Plan of Care  []  See progress note/recertification  []  See Discharge Summary         Progress towards goals / Updated goals:  Short Term Goals: To be accomplished in 6 treatments:  1) Pt will be able to stand for 30 minutes or longer with a pain rating of 5/10 or less in order to safely perform ADLs.  Met  2) Pt will be independent with HEP to continue rehab outside of therapy. Met     Long Term Goals: To be accomplished in 12 treatments:  1) Pt will be able to ambulate for 10 minutes without pain to safely navigate his community. Met   2) Pt will demonstrate 5/5 L knee flexion MMT to be able to safely move furniture. Met  3) Pt will report no radiating symptoms in a span of 2 days or greater to demonstrate return to [facility]    PLAN  [x]  Upgrade activities as tolerated     [x]  Continue plan of care  []  Update interventions per flow sheet       []  Discharge due to:_  []  Other:_      Raza Dasilva [facility] 3/18/2021

## 2021-03-23 ENCOUNTER — HOSPITAL ENCOUNTER (OUTPATIENT)
Dept: PHYSICAL THERAPY | Age: 71
Discharge: HOME | End: 2021-03-23
Payer: MEDICARE

## 2021-03-23 PROCEDURE — 97110 THERAPEUTIC EXERCISES: CPT

## 2021-03-23 PROCEDURE — 97014 ELECTRIC STIMULATION THERAPY: CPT

## 2021-03-23 PROCEDURE — 97012 MECHANICAL TRACTION THERAPY: CPT

## 2021-03-23 NOTE — PROGRESS NOTES
PT DAILY TREATMENT NOTE - [facility] 2-15    Patient Name: Simone Villasenor  Date:3/23/2021  : 1950  [x]  Patient  Verified  Payor: Abad German / Plan: [id number] / Product Type: Managed Care Medicare /    In time:952 am  Out time:*1058 am  Total Treatment Time (min): 66  Total Timed Codes (min): 51  1:1 Treatment Time ( [address] only): 51   Visit #:  11    Treatment Area: Low back pain [M54.5]    SUBJECTIVE  Pain Level (0-10 scale): 1/10  Any medication changes, allergies to medications, adverse drug reactions, diagnosis change, or new procedure performed?: [x] No    [] Yes (see summary sheet for update)  Subjective functional status/changes:   [] No changes reported  \"Pt reports he is doing better with some lingering soreness in his L hip and back. .\"    OBJECTIVE    Modality rationale: decrease pain, increase tissue extensibility and increase muscle contraction/control to improve the patients ability to increase functional mobility of back and legs.    Min Type Additional Details      15 [x] Estim: []Att   [x]Unatt    []TENS instruct                  [x]IFC  []Premod   []NMES                    []Other:  []w/US      [x]w/ heat  []w/ ice  Position: supine w/ traction  Location: L low back and glute      15 [x]  Traction: [] Cervical       [x]Lumbar                       [] Prone          [x]Supine                       [x]Intermittent   []Continuous Lbs: 70/40  [] before manual  [] after manual  [x] w/ heat  [x] Simultaneously performed with w/ Estim    []  Ultrasound: []Continuous   [] Pulsed                       at: []1MHz   []3MHz Location:  W/cm2:    [] Paraffin         Location:   []w/heat    []  Ice     []  Heat  []  Ice massage Position:  Location:    []  Laser  []  Other: Position:  Location:      []  Vasopneumatic Device Pressure:       [] lo [] med [] hi   [] w/ ice      Temperature:   [] Simultaneously performed with w/ Estim     [x] Skin assessment post-treatment:  [x]intact []redness- no adverse reaction     []redness - adverse reaction:     51 min Therapeutic Exercise:  [x] See flow sheet :   Rationale: increase ROM, increase strength, improve coordination and improve balance to improve the patients ability to increase trunk motion and LE activity tolerance and ability to perform tasks. With   [] TE   [] TA   [] neuro   [] other: Patient Education: [x] Review HEP    [] Progressed/Changed HEP based on:   [] positioning   [] body mechanics   [] transfers   [] heat/ice application    [] other:          Pain Level (0-10 scale) post treatment: 0/10    ASSESSMENT/Changes in Function:   The pt tolerated treatment session with stretching and ex prior to traction and mild stretching post traction to increase activity after sustained position. Pt doing well with some lingering tightness in L hip with ABD and piriformis motions. Pt notes compliance with HEP. .   Patient will continue to benefit from skilled PT services to modify and progress therapeutic interventions, address functional mobility deficits, address ROM deficits, address strength deficits, analyze and address soft tissue restrictions, analyze and cue movement patterns, analyze and modify body mechanics/ergonomics and assess and modify postural abnormalities to attain remaining goals     [x]  See Plan of Care  []  See progress note/recertification  []  See Discharge Summary         Progress towards goals / Updated goals:  Short Term Goals: To be accomplished in 6 treatments:  1) Pt will be able to stand for 30 minutes or longer with a pain rating of 5/10 or less in order to safely perform ADLs.  Met  2) Pt will be independent with HEP to continue rehab outside of therapy. Met     Long Term Goals: To be accomplished in 12 treatments:  1) Pt will be able to ambulate for 10 minutes without pain to safely navigate his community. Met   2) Pt will demonstrate 5/5 L knee flexion MMT to be able to safely move furniture. Met  3) Pt will report no radiating symptoms in a span of 2 days or greater to demonstrate return to PLOF. Met       PLAN  [x]  Upgrade activities as tolerated     [x]  Continue plan of care  []  Update interventions per flow sheet       []  Discharge due to:_  []  Other:_        Tegan Cobos 3/23/2021

## 2021-03-25 ENCOUNTER — HOSPITAL ENCOUNTER (OUTPATIENT)
Dept: PHYSICAL THERAPY | Age: 71
Discharge: HOME | End: 2021-03-25
Payer: MEDICARE

## 2021-03-25 PROCEDURE — 97110 THERAPEUTIC EXERCISES: CPT

## 2021-03-25 PROCEDURE — 97014 ELECTRIC STIMULATION THERAPY: CPT

## 2021-03-25 PROCEDURE — 97012 MECHANICAL TRACTION THERAPY: CPT

## 2021-03-25 NOTE — PROGRESS NOTES
PT DAILY TREATMENT NOTE - [facility] 2-15    Patient Name: Shannan Cross  Date:3/25/2021  : 1950  [x]  Patient  Verified  Payor: [address] [place] / Plan: [id number] / Product Type: Managed Care Medicare /    In time:10:00 AM  Out time:10:55 AM  Total Treatment Time (min): 55  Total Timed Codes (min): 40  1:1 Treatment Time ( [address] only): 40   Visit #: 12    Treatment Area: Low back pain [M54.5]    SUBJECTIVE  Pain Level (0-10 scale): 1  Any medication changes, allergies to medications, adverse drug reactions, diagnosis change, or new procedure performed?: [x] No    [] Yes (see summary sheet for update)  Subjective functional status/changes:   [] No changes reported    Patient stated that he had a conference call with his MD on 2021. MD was pleased with my progress, but wanted me to continue therapy for another   6 visits. OBJECTIVE    Modality rationale: increase tissue extensibility and increase muscle contraction/control to improve the patients ability to  ambulate for 10 minutes without pain to safely navigate his community.    Min Type Additional Details      15 [] Estim: []Att   [x]Unatt    []TENS instruct                  [x]IFC  []Premod   []NMES                     []Other:  []w/US   []w/ice   [x]w/heat  Position: low back  Location: supine applied with traction       [x]  Traction: [] Cervical       [x]Lumbar                       [] Prone          []Supine                       [x]Intermittent   []Continuous Lbs: 70 max and 40 min  [] before manual  [] after manual  [x]w/heat    []  Ultrasound: []Continuous   [] Pulsed                       at: []1MHz   []3MHz Location:  W/cm2:    [] Paraffin         Location:   []w/heat    []  Ice     []  Heat  []  Ice massage Position:  Location:    []  Laser  []  Other: Position:  Location:      []  Vasopneumatic Device Pressure:       [] lo [] med [] hi   Temperature:      [x] Skin assessment post-treatment: [x]intact []redness- no adverse reaction    []redness - adverse reaction:     40 min Therapeutic Exercise:  [x] See flow sheet :   Rationale: increase ROM and increase strength to improve the patients ability to demonstrate 5/5 L knee flexion MMT   to be able to safely move furniture. With   [] TE   [] TA   [] Neuro   [] SC   [] other: Patient Education: [x] Review HEP    [] Progressed/Changed HEP based on:   [] positioning   [] body mechanics   [] transfers   [] heat/ice application    [] other:        Pain Level (0-10 scale) post treatment: 0    ASSESSMENT/Changes in Function:   Patient is doing well and would like to complete his last 4 physical therapy visits focusing on strengthening and flexibility of the lumbar. Continues to have intermittent pain but able to return to daily activities. Included hamstring stretches and lumbar stretching in standing to decrease muscle stiffness and improve flexibility. Patient will continue to benefit from skilled PT services to address functional mobility deficits, address strength deficits and analyze and modify body mechanics/ergonomics to attain remaining goals. [x]  See Plan of Care  []  See progress note/recertification  []  See Discharge Summary           Progress towards goals / Updated goals:  Short Term Goals: To be accomplished in 6 treatments:  1) Pt will be able to stand for 30 minutes or longer with a pain rating of 5/10 or less in order to safely perform ADLs.  Met  2) Pt will be independent with HEP to continue rehab outside of therapy. Met     Long Term Goals: To be accomplished in 12 treatments:  1) Pt will be able to ambulate for 10 minutes without pain to safely navigate his community. Met   2) Pt will demonstrate 5/5 L knee flexion MMT to be able to safely move furniture. Met  3) Pt will report no radiating symptoms in a span of 2 days or greater to demonstrate return to PLOF. Met    PLAN  []  Upgrade activities as tolerated     [x] Continue plan of care  []  Update interventions per flow sheet       []  Discharge due to:_  []  Other:_      Sara Bobo, PT 3/25/2021

## 2021-03-30 ENCOUNTER — HOSPITAL ENCOUNTER (OUTPATIENT)
Dept: PHYSICAL THERAPY | Age: 71
Discharge: HOME | End: 2021-03-30
Payer: MEDICARE

## 2021-03-30 PROCEDURE — 97014 ELECTRIC STIMULATION THERAPY: CPT

## 2021-03-30 PROCEDURE — 97110 THERAPEUTIC EXERCISES: CPT

## 2021-03-30 PROCEDURE — 97012 MECHANICAL TRACTION THERAPY: CPT

## 2021-03-30 NOTE — PROGRESS NOTES
PT DAILY TREATMENT NOTE - [facility] 2-15    Patient Name: Francy Walden  Date:3/30/2021  : 1950  [x]  Patient  Verified  Payor: Sudha Tran / Plan: [address] / Product Type: Managed Care Medicare /    In time: 9:55 AM  Out time:11:06 AM  Total Treatment Time (min): 61  Total Timed Codes (min): 43  1:1 Treatment Time ([facility] only): 43   Visit #: 13    Treatment Area: Low back pain [M54.5]    SUBJECTIVE  Pain Level (0-10 scale): Any medication changes, allergies to medications, adverse drug reactions, diagnosis change, or new procedure performed?: [x] No    [] Yes (see summary sheet for update)  Subjective functional status/changes:   [] No changes reported    The traction made me a little sore last week. OBJECTIVE    Modality rationale: decrease pain and increase tissue extensibility to improve the patients ability to ambulate for 10 minutes without pain to safely navigate his community.    Min Type Additional Details       [x] Estim: []Att   []Unatt    []TENS instruct                  [x]IFC  []Premod   []NMES                     []Other: with traction []w/US   []w/ice   [x]w/heat  Position: supine  Location: low back      18 [x]  Traction: [] Cervical       [x]Lumbar                       [] Prone          [x]Supine                       [x]Intermittent   []Continuous Lbs: 65 lbs max 40 min  [] before manual  [] after manual  [x]w/heat    []  Ultrasound: []Continuous   [] Pulsed                       at: []1MHz   []3MHz Location:  W/cm2:    [] Paraffin         Location:   []w/heat    []  Ice     []  Heat  []  Ice massage Position:  Location:    []  Laser  []  Other: Position:  Location:      []  Vasopneumatic Device Pressure:       [] lo [] med [] hi   Temperature:      [x] Skin assessment post-treatment:  [x]intact []redness- no adverse reaction    []redness - adverse reaction:     43 min Therapeutic Exercise:  [x] See flow sheet :   Rationale: increase strength to improve the patients ability to be able to safely move furniture . With   [] TE   [] TA   [] Neuro   [] SC   [] other: Patient Education: [x] Review HEP    [] Progressed/Changed HEP based on:   [] positioning   [] body mechanics   [] transfers   [] heat/ice application    [] other:          Pain Level (0-10 scale) post treatment: 0    ASSESSMENT/Changes in Function:   Patient is doing well. Performing exercises on a regular basis. Already followed up with MD. Patient is benefiting from lumbar traction. No   swelling present in low back area and pain is minimal. Patient will continue to benefit from skilled PT services to address strength deficits to attain remaining goals. [x]  See Plan of Care  []  See progress note/recertification  []  See Discharge Summary           Progress towards goals / Updated goals:  Short Term Goals: To be accomplished in 6 treatments:  1) Pt will be able to stand for 30 minutes or longer with a pain rating of 5/10 or less in order to safely perform ADLs.  Met  2) Pt will be independent with HEP to continue rehab outside of therapy. Met     Long Term Goals: To be accomplished in 12 treatments:  1) Pt will be able to ambulate for 10 minutes without pain to safely navigate his community. Met   2) Pt will demonstrate 5/5 L knee flexion MMT to be able to safely move furniture. Met  3) Pt will report no radiating symptoms in a span of 2 days or greater to demonstrate return to PLOF. Met       PLAN  []  Upgrade activities as tolerated     [x]  Continue plan of care  []  Update interventions per flow sheet       []  Discharge due to:_  []  Other:_      Kisha Liu, PT 3/30/2021

## 2021-04-01 ENCOUNTER — HOSPITAL ENCOUNTER (OUTPATIENT)
Dept: PHYSICAL THERAPY | Age: 71
Discharge: HOME | End: 2021-04-01
Payer: MEDICARE

## 2021-04-01 PROCEDURE — 97014 ELECTRIC STIMULATION THERAPY: CPT

## 2021-04-01 PROCEDURE — 97530 THERAPEUTIC ACTIVITIES: CPT

## 2021-04-01 PROCEDURE — 97110 THERAPEUTIC EXERCISES: CPT

## 2021-04-01 PROCEDURE — 97012 MECHANICAL TRACTION THERAPY: CPT

## 2021-04-01 NOTE — PROGRESS NOTES
[address]  [place], [place]  Ph: [phone]    Fax: [phone]    Progress Note    Name: Ira Evans   : 1950   MD: Shasha Zavala MD       Treatment Diagnosis: Low back pain [M54.5]  Start of Care: 2021    Visits from Start of Care: 14   Missed Visits: 0    Summary of Care / Assessment / Recommendations:   Pt has received treatment including lumbopelvic and lower extremity flexibility, ROM, strengthening, pelvic and core stabilization, functional activities, mechanical lumbar traction, and education in posture and body mechanics. Pt has demonstrated overall progress in pain level, functional mobility, rom, strength, and has made progress toward goals. Pt continues to present with functional deficits including endurance deficits with prolonged standing and activity, as well as periodic pain with increases in activity. Pt would benefit from further skilled physical therapy interventions to continue to progress range of motion, strength, and balance, allowing for improved function. Further treatment to focus on core stabilization, ensuring compliance with HEP, and weaning off mechanical traction during treatment sessions.            Progress Toward Goals:  Short Term Goals: To be accomplished in 6 treatments:  1) Pt will be able to stand for 30 minutes or longer with a pain rating of 5/10 or less in order to safely perform ADLs. partal Met  2) Pt will be independent with HEP to continue rehab outside of therapy. Met     Long Term Goals: To be accomplished in 12 treatments:  1) Pt will be able to ambulate for 10 minutes without pain to safely navigate his community. Met   2) Pt will demonstrate 5/5 L knee flexion MMT to be able to safely move furniture. Met  3) Pt will report no radiating symptoms in a span of 2 days or greater to demonstrate return to PLOF. In Progress Not met (21)    Frequency/Duration:  2 treatments per week, for 10 treatments. Rigoberto Keane PT, DPT  4/1/2021         ________________________________________________________________________  NOTE TO PHYSICIAN:  Please complete the following and fax to:  [facility]:   Fax: [phone]  . Retain this original for your records. If you are unable to process this request in 24 hours, please contact our office.        ____ I have read the above report and request that my patient continue therapy with the following changes/special instructions:  ____ I have read the above report and request that my patient be discharged from therapy    Physician's Signature:_________________ Date:___________Time:__________

## 2021-04-01 NOTE — PROGRESS NOTES
PT DAILY TREATMENT NOTE - [facility] 2-15    Patient Name: Augustus Wolfe  Date:2021  : 1950  [x]  Patient  Verified  Payor: [facility] MEDICARE / Plan: [email or web address] / Product Type: [employer] /    In time:10:00 am  Out time:1115 am  Total Treatment Time (min): 75  Total Timed Codes (min): 60  1:1 Treatment Time ( [address] only): 60  Visit #:  14    Treatment Area: Low back pain [M54.5]    SUBJECTIVE  Pain Level (0-10 scale): 10  Any medication changes, allergies to medications, adverse drug reactions, diagnosis change, or new procedure performed?: [x] No    [] Yes (see summary sheet for update)  Subjective functional status/changes:   [] No changes reported  \"Pt reports that he feels he is slipping back some with his progress as he has more pain today and last session. \"    OBJECTIVE    Modality rationale: decrease pain and increase tissue extensibility to improve the patients ability to increase functional motion    Min Type Additional Details      15 [x] Estim: []Att   [x]Unatt    []TENS instruct                  [x]IFC  []Premod   []NMES                    []Other:  []w/US      [x]w/ heat  []w/ ice  Position: supine w/ traction  Location: L low back /hip area      15 [x]  Traction: [] Cervical       [x]Lumbar                       [] Prone          [x]Supine                       [x]Intermittent   []Continuous Lbs:70/40  [] before manual  [] after manual  [x] w/ heat  [x] Simultaneously performed with w/ Estim    []  Ultrasound: []Continuous   [] Pulsed                       at: []1MHz   []3MHz Location:  W/cm2:    [] Paraffin         Location:   []w/heat    []  Ice     []  Heat  []  Ice massage Position:  Location:    []  Laser  []  Other: Position:  Location:      []  Vasopneumatic Device Pressure:       [] lo [] med [] hi   [] w/ ice      Temperature:   [] Simultaneously performed with w/ Estim     [x] Skin assessment post-treatment:  [x]intact []redness- no adverse reaction     []redness - adverse reaction:     45 min Therapeutic Exercise:  [x] See flow sheet :   Rationale: increase ROM, increase strength and improve coordination to improve the patients ability to increase daily task motion     15 min Therapeutic Activity:  [x]  See flow sheet :   Rationale: increase ROM and increase strength  to improve the patients ability to perform task anf reach set goal, reviewed measurements. With   [] TE   [] TA   [] neuro   [] other: Patient Education: [x] Review HEP    [] Progressed/Changed HEP based on:   [] positioning   [] body mechanics   [] transfers   [] heat/ice application    [] other:      Other Objective/Functional Measures: Posture:  Sitting comfortably edge of bed with no apparent complaints of pain or discomfort.   Gait and Functional Mobility:  No significant observations  Palpation: No tenderness noted no longer having tenderness along pt's tibial tuberosity, does report on rare occasion burning occurs, but quickly resolves.                                                             Lumbar AROM:                                               Flexion                                              60 degrees                                               Extension                                         20 degrees (slight dizziness with looking up)                                                                                                     R                                 L  Side Bending                           20 degrees                  25 degrees                    Rotation                                   30 degrees                  40 degrees                       Manual Muscle Testing  Hip Flexion                                          5/5                  5/5  Knee Extension                                  5/5                    5/5  Knee Flexion                                       5/5                   5/5  Ankle VK                                             6/1                   9/5  Ankle DF                                             5/5                   5/5                                                    Special Tests:               Slump: -.     Pain Level (0-10 scale) post treatment: 0/10    ASSESSMENT/Changes in Function:   The pt tolerated treatment session with review of goals and ROM Pt notes that he can  on place for up to 12 to 15 mind before he starts to fatigue has no sit not from pain just fatigue. Note Pt also notes that he can walk up to 10 mins. , however that is his max limit anything past 10 and the pain returns. Pt tolerated ex and stretching, and returned to higher pull. To continue with HEP. DPT discussed continued services.    Patient will continue to benefit from skilled PT services to modify and progress therapeutic interventions, address functional mobility deficits, address ROM deficits, address strength deficits, analyze and address soft tissue restrictions, analyze and cue movement patterns and assess and modify postural abnormalities to attain remaining goals     [x]  See Plan of Care  []  See progress note/recertification  []  See Discharge Summary         Progress towards goals / Updated goals:  Short Term Goals: To be accomplished in 6 treatments:  1) Pt will be able to stand for 30 minutes or longer with a pain rating of 5/10 or less in order to safely perform ADLs. partal Met  2) Pt will be independent with HEP to continue rehab outside of therapy. Met     Long Term Goals: To be accomplished in 12 treatments:  1) Pt will be able to ambulate for 10 minutes without pain to safely navigate his community. Met   2) Pt will demonstrate 5/5 L knee flexion MMT to be able to safely move furniture. Met  3) Pt will report no radiating symptoms in a span of 2 days or greater to demonstrate return to PLOF. In Progress Not met (4/1/21)    PLAN  [x]  Upgrade activities as tolerated     [x] Continue plan of care  []  Update interventions per flow sheet       []  Discharge due to:_  []  Other:_      Jeri Girard 4/1/2021

## 2021-04-06 ENCOUNTER — HOSPITAL ENCOUNTER (OUTPATIENT)
Dept: PHYSICAL THERAPY | Age: 71
Discharge: HOME | End: 2021-04-06
Payer: MEDICARE

## 2021-04-06 PROCEDURE — 97014 ELECTRIC STIMULATION THERAPY: CPT

## 2021-04-06 PROCEDURE — 97110 THERAPEUTIC EXERCISES: CPT

## 2021-04-06 PROCEDURE — 97012 MECHANICAL TRACTION THERAPY: CPT

## 2021-04-06 NOTE — PROGRESS NOTES
PT DAILY TREATMENT NOTE - [facility] 2-15    Patient Name: Christopher Willson  Date:2021  : 1950  [x]  Patient  Verified  Payor: [place] [facility] MEDICARE / Plan: [employer] / Product Type: Managed Care Medicare /    In time:10:00 AM  Out time: 11:03 AM  Total Treatment Time (min): 63  Total Timed Codes (min): 40  1:1 Treatment Time ( [address] only): 40   Visit #:  15    Treatment Area: Low back pain [M54.5]    SUBJECTIVE  Pain Level (0-10 scale): 1  Any medication changes, allergies to medications, adverse drug reactions, diagnosis change, or new procedure performed?: [x] No    [] Yes (see summary sheet for update)  Subjective functional status/changes:   [] No changes reported    Doing well today.     OBJECTIVE    Modality rationale: increase tissue extensibility to improve the patients ability to 3) Pt will report no radiating symptoms in a span of 2 days or greater to demonstrate return to PLOF   Min Type Additional Details      15 [x] Estim: []Att   []Unatt    []TENS instruction               [x]IFC  []Premod   []NMES                     []Other:  []w/US   []w/ice   [x]w/heat  Position:  Location:      18 [x]  Traction: [] Cervical       [x]Lumbar                       [] Prone          [x]Supine                       []Intermittent   []Continuous Lbs: 70 max 40 minimal  [] before manual  [] after manual  [x]w/heat     []  Ultrasound: []Continuous   [] Pulsed                       at: []1MHz   []3MHz Location:  W/cm2:    [] Paraffin         Location:   []w/heat    []  Ice     []  Heat  []  Ice massage Position:  Location:    []  Laser  []  Other: Position:  Location:      []  Vasopneumatic Device Pressure:       [] lo [] med [] hi   Temperature:      [x] Skin assessment post-treatment:  [x]intact []redness- no adverse reaction    []redness - adverse reaction:     40 min Therapeutic Exercise:  [x] See flow sheet :   Rationale: increase ROM, increase strength, improve coordination, improve balance and increase proprioception to improve the patients ability to  report no radiating symptoms in a span of 2 days or greater to demonstrate return to PLOF          With   [] TE   [] TA   [] Neuro   [] SC   [] other: Patient Education: [x] Review HEP    [] Progressed/Changed HEP based on:   [] positioning   [] body mechanics   [] transfers   [] heat/ice application    [] other:           Pain Level (0-10 scale) post treatment: 0    ASSESSMENT/Changes in Function:   Patient is experiencing no pain following therapy. Continuing lumbar traction for the last few visits to address radiating leg symptoms. Patient will continue to benefit from skilled PT services to address strength deficits and analyze and cue movement patterns to attain remaining goals.      [x]  See Plan of Care  []  See progress note/recertification  []  See Discharge Summary           Progress Toward Goals:  Short Term Goals: To be accomplished in 6 treatments:  1) Pt will be able to stand for 30 minutes or longer with a pain rating of 5/10 or less in order to safely perform ADLs. partal Met  2) Pt will be independent with HEP to continue rehab outside of therapy. Met     Long Term Goals: To be accomplished in 12 treatments:  1) Pt will be able to ambulate for 10 minutes without pain to safely navigate his community. Met   2) Pt will demonstrate 5/5 L knee flexion MMT to be able to safely move furniture. Met  3) Pt will report no radiating symptoms in a span of 2 days or greater to demonstrate return to PLOF. In Progress Not met (4/1/21    PLAN  []  Upgrade activities as tolerated     [x]  Continue plan of care  []  Update interventions per flow sheet       []  Discharge due to:_  []  Other:_      Derek Skinner, PT 4/6/2021

## 2021-04-08 ENCOUNTER — HOSPITAL ENCOUNTER (OUTPATIENT)
Dept: PHYSICAL THERAPY | Age: 71
Discharge: HOME | End: 2021-04-08
Payer: MEDICARE

## 2021-04-08 PROCEDURE — 97110 THERAPEUTIC EXERCISES: CPT

## 2021-04-08 PROCEDURE — 97012 MECHANICAL TRACTION THERAPY: CPT

## 2021-04-08 PROCEDURE — 97014 ELECTRIC STIMULATION THERAPY: CPT

## 2021-04-08 NOTE — PROGRESS NOTES
PT DAILY TREATMENT NOTE - [facility] -15    Patient Name: Janine Muhammad  Date:2021  : 1950  [x]  Patient  Verified  Payor: [place] [facility] MEDICARE / Plan: [employer] / Product Type: Managed Care Medicare /    In time:900 am  Out time:1012 am  Total Treatment Time (min): 72  Total Timed Codes (min): 57  1:1 Treatment Time ( only): 62   Visit #:  16    Treatment Area: Low back pain [M54.5]    SUBJECTIVE  Pain Level (0-10 scale): 0-1/10  Any medication changes, allergies to medications, adverse drug reactions, diagnosis change, or new procedure performed?: [x] No    [] Yes (see summary sheet for update)  Subjective functional status/changes:   [] No changes reported  \"Pt stated he did yard work on [employer] and was very painful until Wed and is just feeling better this morning. \"    OBJECTIVE    Modality rationale: decrease inflammation, decrease pain and increase tissue extensibility to improve the patients ability to functionally move throughout daily activities   Min Type Additional Details      15 [x] Estim: []Att   [x]Unatt    []TENS instruct                  [x]IFC  []Premod   []NMES                    []Other:  []w/US      [x]w/ heat  []w/ ice  Position: supine with traction  Location: L low back and SIJ region      15 [x]  Traction: [] Cervical       [x]Lumbar                       [] Prone          [x]Supine                       [x]Intermittent   []Continuous Lbs: 70/40  [] before manual  [] after manual  [x] w/ heat  [x] Simultaneously performed with w/ Estim    []  Ultrasound: []Continuous   [] Pulsed                       at: []1MHz   []3MHz Location:  W/cm2:    [] Paraffin         Location:   []w/heat    []  Ice     []  Heat  []  Ice massage Position:  Location:    []  Laser  []  Other: Position:  Location:      []  Vasopneumatic Device Pressure:       [] lo [] med [] hi   [] w/ ice      Temperature:   [] Simultaneously performed with w/ Estim     [x] Skin assessment post-treatment:  [x]intact []redness- no adverse reaction     []redness - adverse reaction:     57 min Therapeutic Exercise:  [x] See flow sheet :   Rationale: increase ROM, increase strength and improve coordination to improve the patients ability to increase trunk mobility and ability to perform daily tasks. With   [] TE   [] TA   [] neuro   [] other: Patient Education: [x] Review HEP    [] Progressed/Changed HEP based on:   [] positioning   [] body mechanics   [] transfers   [] heat/ice application    [] other:        Pain Level (0-10 scale) post treatment: 0/10    ASSESSMENT/Changes in Function:   The pt tolerated treatment well with modification posture corrections with lifting and core strengthening activities. Pt also worked on consistency with gait for goal of 10' walk. Pt notes compliance with HEP and updating next visit with new core strengthening exercises. Pt to trial no traction next week and advance HEP to more core strengthening if no new c/o from today's activities.    Patient will continue to benefit from skilled PT services to modify and progress therapeutic interventions, address functional mobility deficits, address ROM deficits, address strength deficits, analyze and modify body mechanics/ergonomics and assess and modify postural abnormalities to attain remaining goals      [x]  See Plan of Care  [x]  See progress note/recertification  []  See Discharge Summary         Progress towards goals / Updated goals:  Short Term Goals: To be accomplished in 6 treatments:  1) Pt will be able to stand for 30 minutes or longer with a pain rating of 5/10 or less in order to safely perform ADLs. partal Met  2) Pt will be independent with HEP to continue rehab outside of therapy. Met     Long Term Goals: To be accomplished in 12 treatments:  1) Pt will be able to ambulate for 10 minutes without pain to safely navigate his community. Met   2) Pt will demonstrate 5/5 L knee flexion MMT to be able to safely move furniture. Met  3) Pt will report no radiating symptoms in a span of 2 days or greater to demonstrate return to PLOF. In Progress Not met (4/1/21    PLAN  [x]  Upgrade activities as tolerated     [x]  Continue plan of care  []  Update interventions per flow sheet       []  Discharge due to:_  []  Other:_      Sudeep Jordan 4/8/2021

## 2021-04-13 ENCOUNTER — HOSPITAL ENCOUNTER (OUTPATIENT)
Dept: PHYSICAL THERAPY | Age: 71
Discharge: HOME | End: 2021-04-13
Payer: MEDICARE

## 2021-04-13 PROCEDURE — 97014 ELECTRIC STIMULATION THERAPY: CPT

## 2021-04-13 PROCEDURE — 97110 THERAPEUTIC EXERCISES: CPT

## 2021-04-13 NOTE — PROGRESS NOTES
PT DAILY TREATMENT NOTE - [facility] 2-15    Patient Name: Delma Phillips  Date:2021  : 1950  [x]  Patient  Verified  Payor: [place] [facility] MEDICARE / Plan: [employer] / Product Type: Managed Care Medicare /    In time:955 am  Out time:1107 am  Total Treatment Time (min): 72  Total Timed Codes (min): 62  1:1 Treatment Time ( [address] only): 62   Visit #:  17    Treatment Area: Low back pain [M54.5]    SUBJECTIVE  Pain Level (0-10 scale): 1/10  Any medication changes, allergies to medications, adverse drug reactions, diagnosis change, or new procedure performed?: [x] No    [] Yes (see summary sheet for update)  Subjective functional status/changes:   [] No changes reported  \"Pt reports no increase in pain over the weekend and no problems with new resistance ex last session. \"    OBJECTIVE    Modality rationale: decrease inflammation, decrease pain and increase tissue extensibility to improve the patients ability to increase L hip/ low back motion    Min Type Additional Details   10 [x] Estim: []Att   [x]Unatt    []TENS instruct                  []IFC  [x]Premod   []NMES                    []Other:  []w/US      []w/ heat  [x]w/ ice  Position: seated  Location: L low back /hip area       []  Traction: [] Cervical       []Lumbar                       [] Prone          []Supine                       []Intermittent   []Continuous Lbs:  [] before manual  [] after manual  [] w/ heat  [] Simultaneously performed with w/ Estim    []  Ultrasound: []Continuous   [] Pulsed                       at: []1MHz   []3MHz Location:  W/cm2:    [] Paraffin         Location:   []w/heat   10 [x]  Ice     []  Heat  []  Ice massage Position: seated   Location: across low back    []  Laser  []  Other: Position:  Location:      []  Vasopneumatic Device Pressure:       [] lo [] med [] hi   [] w/ ice      Temperature:   [] Simultaneously performed with w/ Estim     [x] Skin assessment post-treatment:  [x]intact []redness- no adverse reaction     []redness - adverse reaction:     61 min Therapeutic Exercise:  [x] See flow sheet :   Rationale: increase ROM, increase strength and improve coordination to improve the patients ability to increase core strength and trunk control. With   [] TE   [] TA   [] neuro   [] other: Patient Education: [x] Review HEP    [] Progressed/Changed HEP based on:   [] positioning   [] body mechanics   [] transfers   [] heat/ice application    [] other:          Pain Level (0-10 scale) post treatment: 0/10    ASSESSMENT/Changes in Function:   The pt tolerated treatment session with more focus on lateral and core strength of low back and abdomen. Pt updated HEP given and reviewed with [employer] band for home use as well.  .   Patient will continue to benefit from skilled PT services to address strength deficits, analyze and address soft tissue restrictions, analyze and cue movement patterns and analyze and modify body mechanics/ergonomics to attain remaining goals     [x]  See Plan of Care  []  See progress note/recertification  []  See Discharge Summary         Progress towards goals / Updated goals:  Short Term Goals: To be accomplished in 6 treatments:  1) Pt will be able to stand for 30 minutes or longer with a pain rating of 5/10 or less in order to safely perform ADLs. partal Met  2) Pt will be independent with HEP to continue rehab outside of therapy. Met     Long Term Goals: To be accomplished in 12 treatments:  1) Pt will be able to ambulate for 10 minutes without pain to safely navigate his community. Met   2) Pt will demonstrate 5/5 L knee flexion MMT to be able to safely move furniture. Met  3) Pt will report no radiating symptoms in a span of 2 days or greater to demonstrate return to PLOF. In Progress Not met (4/1/21    PLAN  [x]  Upgrade activities as tolerated     [x]  Continue plan of care  []  Update interventions per flow sheet       []  Discharge due to:_  []  Other:_ Raza Leon, Parminder Aquas 4/13/2021

## 2021-04-15 ENCOUNTER — HOSPITAL ENCOUNTER (OUTPATIENT)
Dept: PHYSICAL THERAPY | Age: 71
Discharge: HOME | End: 2021-04-15
Payer: MEDICARE

## 2021-04-15 PROCEDURE — 97110 THERAPEUTIC EXERCISES: CPT

## 2021-04-15 PROCEDURE — 97014 ELECTRIC STIMULATION THERAPY: CPT

## 2021-04-15 NOTE — PROGRESS NOTES
PT DAILY TREATMENT NOTE - [facility] 2-15    Patient Name: Eden Amrbocio  Date:4/15/2021  : 1950  [x]  Patient  Verified  Payor: [facility] MEDICARE / Plan: [employer] / Product Type: Managed Care Medicare /    In time:950 am  Out time:1100 am  Total Treatment Time (min): 70  Total Timed Codes (min): 55  1:1 Treatment Time ([facility] only): 54   Visit #:  18    Treatment Area: Low back pain [M54.5]    SUBJECTIVE  Pain Level (0-10 scale): 5/10  Any medication changes, allergies to medications, adverse drug reactions, diagnosis change, or new procedure performed?: [x] No    [] Yes (see summary sheet for update)  Subjective functional status/changes:   [] No changes reported  \"Pt reports that after last visit he had increase pain in his L hip. \"    OBJECTIVE    Modality rationale: decrease edema, decrease inflammation and decrease pain to improve the patients ability to increase motion of L hip    Min Type Additional Details      15 [x] Estim: []Att   [x]Unatt    []TENS instruct                  [x]IFC  []Premod   []NMES                    []Other:  []w/US      [x]w/ heat  []w/ ice  Position: sidelying  Location: L hip (grt troch)       []  Traction: [] Cervical       []Lumbar                       [] Prone          []Supine                       []Intermittent   []Continuous Lbs:  [] before manual  [] after manual  [] w/ heat  [] Simultaneously performed with w/ Estim    []  Ultrasound: []Continuous   [] Pulsed                       at: []1MHz   []3MHz Location:  W/cm2:    [] Paraffin         Location:   []w/heat   15 []  Ice     [x]  Heat  []  Ice massage Position: sidelying   Location:across left hip    []  Laser  []  Other: Position:  Location:      []  Vasopneumatic Device Pressure:       [] lo [] med [] hi   [] w/ ice      Temperature:   [] Simultaneously performed with w/ Estim     [x] Skin assessment post-treatment:  [x]intact []redness- no adverse reaction     []redness - adverse reaction:     55 min Therapeutic Exercise:  [x] See flow sheet :   Rationale: increase ROM, increase strength, improve coordination and improve balance to improve the patients ability to increase functional activity and motion of back and L hip. With   [] TE   [] TA   [] neuro   [] other: Patient Education: [x] Review HEP    [] Progressed/Changed HEP based on:   [] positioning   [] body mechanics   [] transfers   [] heat/ice application    [] other:        Pain Level (0-10 scale) post treatment: 1/10    ASSESSMENT/Changes in Function:   The pt tolerated treatment session with focus on L hip pain and issues with tightness. Pt to decrease some of the resistance ex and return to basic stretching of LE. Pt did get relief with IFC and MHP. Nikolay Cervantes    Patient will continue to benefit from skilled PT services to modify and progress therapeutic interventions, address functional mobility deficits, address ROM deficits, address strength deficits, analyze and address soft tissue restrictions, analyze and cue movement patterns and analyze and modify body mechanics/ergonomics to attain remaining goals     [x]  See Plan of Care  []  See progress note/recertification  []  See Discharge Summary         Progress towards goals / Updated goals:  Short Term Goals: To be accomplished in 6 treatments:  1) Pt will be able to stand for 30 minutes or longer with a pain rating of 5/10 or less in order to safely perform ADLs. partal Met  2) Pt will be independent with HEP to continue rehab outside of therapy. Met     Long Term Goals: To be accomplished in 12 treatments:  1) Pt will be able to ambulate for 10 minutes without pain to safely navigate his community. Met   2) Pt will demonstrate 5/5 L knee flexion MMT to be able to safely move furniture. Met  3) Pt will report no radiating symptoms in a span of 2 days or greater to demonstrate return to PLOF. In Progress Not met (4/1/21    PLAN  [x]  Upgrade activities as tolerated     [x] Continue plan of care  []  Update interventions per flow sheet       []  Discharge due to:_  []  Other:_      Kathie Sanders 4/15/2021

## 2021-04-20 ENCOUNTER — HOSPITAL ENCOUNTER (OUTPATIENT)
Dept: PHYSICAL THERAPY | Age: 71
Discharge: HOME | End: 2021-04-20
Payer: MEDICARE

## 2021-04-20 PROCEDURE — 97014 ELECTRIC STIMULATION THERAPY: CPT

## 2021-04-20 PROCEDURE — 97110 THERAPEUTIC EXERCISES: CPT

## 2021-04-20 NOTE — PROGRESS NOTES
PT DAILY TREATMENT NOTE - [facility] 2-15    Patient Name: Carla Benito  Date:2021  : 1950  [x]  Patient  Verified  Payor: [place] [employer] MEDICARE / Plan: [email or web address] / Product Type: Managed Care Medicare /    In time:1000 am  Out time:1105 am  Total Treatment Time (min): 65  Total Timed Codes (min): 50  1:1 Treatment Time ( [address] only): 50   Visit #:  19    Treatment Area: Low back pain [M54.5]    SUBJECTIVE  Pain Level (0-10 scale): 2/10  Any medication changes, allergies to medications, adverse drug reactions, diagnosis change, or new procedure performed?: [x] No    [] Yes (see summary sheet for update)  Subjective functional status/changes:   [] No changes reported  \"*Pt reports having an bad weekend with pain in his hip after riding in the truck to get supplies and again last night after sitting in a  meeting. \"    OBJECTIVE    Modality rationale: decrease edema, decrease inflammation, decrease pain and increase tissue extensibility to improve the patients ability to increase hip motion    Min Type Additional Details    15   [x] Estim: []Att   [x]Unatt    []TENS instruct                  []IFC  [x]Premod   []NMES                    []Other:  []w/US      [x]w/ heat  [x]w/ ice  Position: prone  Location: L SIJ and Grt troch areas        []  Traction: [] Cervical       []Lumbar                       [] Prone          []Supine                       []Intermittent   []Continuous Lbs:  [] before manual  [] after manual  [] w/ heat  [] Simultaneously performed with w/ Estim    []  Ultrasound: []Continuous   [] Pulsed                       at: []1MHz   []3MHz Location:  W/cm2:    [] Paraffin         Location:   []w/heat   15 [x]  Ice  (2 min)    [x]  Heat ( 3 min)  []  Ice massage Position:prone   Location: alternating to Low back     []  Laser  []  Other: Position:  Location:      []  Vasopneumatic Device Pressure:       [] lo [] med [] hi   [] w/ ice      Temperature: [] Simultaneously performed with w/ Estim     [x] Skin assessment post-treatment:  [x]intact []redness- no adverse reaction     []redness - adverse reaction:     50 min Therapeutic Exercise:  [x] See flow sheet :   Rationale: increase ROM, increase strength, improve coordination and improve balance to improve the patients ability to increase function of L hip and low back         With   [] TE   [] TA   [] neuro   [] other: Patient Education: [x] Review HEP    [] Progressed/Changed HEP based on:   [] positioning   [] body mechanics   [] transfers   [] heat/ice application    [] other:          Pain Level (0-10 scale) post treatment: 2/10    ASSESSMENT/Changes in Function:   The pt tolerated treatment session with increase attention to lowback (SIJ area) and hip tightness for piriformis. Pt continue to tolerate all activities with an inability to IR and ER L hip and slight anterior rotation of pelvic are with SIJ check. Self mob instructed with see if help and instruct to continue at home.    Patient will continue to benefit from skilled PT services to modify and progress therapeutic interventions, address functional mobility deficits, address ROM deficits, address strength deficits, analyze and address soft tissue restrictions and analyze and cue movement patterns to attain remaining goals     [x]  See Plan of Care  []  See progress note/recertification  []  See Discharge Summary         Progress towards goals / Updated goals:  Short Term Goals: To be accomplished in 6 treatments:  1) Pt will be able to stand for 30 minutes or longer with a pain rating of 5/10 or less in order to safely perform ADLs. partal Met  2) Pt will be independent with HEP to continue rehab outside of therapy. Met     Long Term Goals: To be accomplished in 12 treatments:  1) Pt will be able to ambulate for 10 minutes without pain to safely navigate his community. Met   2) Pt will demonstrate 5/5 L knee flexion MMT to be able to safely move furniture. Met  3) Pt will report no radiating symptoms in a span of 2 days or greater to demonstrate return to PLOF. In Progress Not met (4/1/21       PLAN  [x]  Upgrade activities as tolerated     [x]  Continue plan of care  []  Update interventions per flow sheet       []  Discharge due to:_  []  Other:_      Javier Oneill 4/20/2021

## 2021-04-22 ENCOUNTER — APPOINTMENT (OUTPATIENT)
Dept: PHYSICAL THERAPY | Age: 71
End: 2021-04-22
Payer: MEDICARE

## 2021-04-27 ENCOUNTER — HOSPITAL ENCOUNTER (OUTPATIENT)
Dept: PHYSICAL THERAPY | Age: 71
Discharge: HOME | End: 2021-04-27
Payer: MEDICARE

## 2021-04-27 PROCEDURE — 97110 THERAPEUTIC EXERCISES: CPT

## 2021-04-27 PROCEDURE — 97014 ELECTRIC STIMULATION THERAPY: CPT

## 2021-04-27 PROCEDURE — 97012 MECHANICAL TRACTION THERAPY: CPT

## 2021-04-27 NOTE — PROGRESS NOTES
PT DAILY TREATMENT NOTE - [facility] -15    Patient Name: Nura Angela  Date:2021  : 1950  [x]  Patient  Verified  Payor: [place] [employer] MEDICARE / Plan: [employer] / Product Type: Managed Care Medicare /    In time:1000 am  Out time:1100 am  Total Treatment Time (min): 60  Total Timed Codes (min): 45  1:1 Treatment Time ( [address] only): 45   Visit #:  20    Treatment Area: Low back pain [M54.5]    SUBJECTIVE  Pain Level (0-10 scale): 2/10  Any medication changes, allergies to medications, adverse drug reactions, diagnosis change, or new procedure performed?: [x] No    [] Yes (see summary sheet for update)  Subjective functional status/changes:   [] No changes reported  \"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 \"    OBJECTIVE    Modality rationale: decrease inflammation, decrease pain and increase tissue extensibility to improve the patients ability to increase Low back and L hip motion    Min Type Additional Details      15 [x] Estim: []Att   [x]Unatt    []TENS instruct                  [x]IFC  []Premod   []NMES                    []Other:  []w/US      [x]w/ heat  []w/ ice  Position: supine with traction   Location: L hip       15 [x]  Traction: [] Cervical       [x]Lumbar                       [] Prone          [x]Supine                       [x]Intermittent   []Continuous Lbs:70/40  [] before manual  [] after manual  [x] w/ heat  [x] Simultaneously performed with w/ Estim    []  Ultrasound: []Continuous   [] Pulsed                       at: []1MHz   []3MHz Location:  W/cm2:    [] Paraffin         Location:   []w/heat    []  Ice     []  Heat  []  Ice massage Position:  Location:    []  Laser  []  Other: Position:  Location:      []  Vasopneumatic Device Pressure:       [] lo [] med [] hi   [] w/ ice      Temperature:   [] Simultaneously performed with w/ Estim     [x] Skin assessment post-treatment:  [x]intact []redness- no adverse reaction     []redness - adverse reaction:     45 min Therapeutic Exercise:  [] See flow sheet :   Rationale: increase ROM, increase strength and improve coordination to improve the patients ability to increase low back and hip active levels and no return of pain with WB action       With   [] TE   [] TA   [] neuro   [] other: Patient Education: [x] Review HEP    [] Progressed/Changed HEP based on:   [] positioning   [] body mechanics   [] transfers   [] heat/ice application    [] other:        Pain Level (0-10 scale) post treatment: 0/10    ASSESSMENT/Changes in Function:   The pt tolerated treatment session with return to use of traction and straight line stretching for hips, core ex still being utilized and pt encouraged to return to DR and have hip further checked out due to last c/o. Low back continues to show improvement. Pt notes compliance with HEP.  .   Patient will continue to benefit from skilled PT services to modify and progress therapeutic interventions, address functional mobility deficits, address ROM deficits, address strength deficits, analyze and cue movement patterns and assess and modify postural abnormalities to attain remaining goals     [x]  See Plan of Care  []  See progress note/recertification  []  See Discharge Summary         Progress towards goals / Updated goals:  Short Term Goals: To be accomplished in 6 treatments:  1) Pt will be able to stand for 30 minutes or longer with a pain rating of 5/10 or less in order to safely perform ADLs. partal Met  2) Pt will be independent with HEP to continue rehab outside of therapy. Met     Long Term Goals: To be accomplished in 12 treatments:  1) Pt will be able to ambulate for 10 minutes without pain to safely navigate his community. Met   2) Pt will demonstrate 5/5 L knee flexion MMT to be able to safely move furniture. Met  3) Pt will report no radiating symptoms in a span of 2 days or greater to demonstrate return to PLOF. In Progress Not met (4/1/21)    PLAN  [x]  Upgrade activities as tolerated     [x]  Continue plan of care  []  Update interventions per flow sheet       []  Discharge due to:_  []  Other:_      Manas Fernando 4/27/2021

## 2021-04-29 ENCOUNTER — HOSPITAL ENCOUNTER (OUTPATIENT)
Dept: PHYSICAL THERAPY | Age: 71
Discharge: HOME | End: 2021-04-29
Payer: MEDICARE

## 2021-04-29 PROCEDURE — 97012 MECHANICAL TRACTION THERAPY: CPT

## 2021-04-29 PROCEDURE — 97014 ELECTRIC STIMULATION THERAPY: CPT

## 2021-04-29 PROCEDURE — 97110 THERAPEUTIC EXERCISES: CPT

## 2021-04-29 NOTE — PROGRESS NOTES
[address]  [place], [place]  Ph: [phone]    Fax: [phone]    Progress Note    Name: Ira Evans   : 1950   MD: Shasha Zavala MD       Treatment Diagnosis: Low back pain [M54.5]  Start of Care: 2021    Visits from Start of Care: 21  Missed Visits: 1    Summary of Care:Assessment/ key information:   Pt is a 65year old male that presents with signs and symptoms likely consistent with low back dysfunction. Pt is a good candidate for rehab as he is a healthy and active individual who is motivated to return to his PLOF. Pt has received treatment including lumbopelvic and lower extremity flexibility, ROM, strengthening, pelvic and core stabilization, functional activities, mechanical lumbar traction, and education in posture and body mechanics. Pt has demonstrated overall progress in pain level, functional mobility, rom, strength, and has made progress toward goals. Pt has shown improvement in functional deficits including endurance deficits with prolonged standing and activity, as well as periodic pain with increases in activity. However, patient is ambulating with an antalgic gait pattern due to painful hip. Advised patient to follow up with MD for hip pain. Physical Therapy goals have been met for low back pain. No further treatment is recommended from Physical Therapy at this time. Assessment / Recommendations:     Short Term Goals: To be accomplished in 6 treatments:  Goal:  Pt will be able to stand for 30 minutes or longer with a pain rating of 5/10 or less in order to safely perform ADLs.    Status at progress note: Met  Goal:  Pt will be independent with HEP to continue rehab outside of therapy. Status at progress note:  Met     Long Term Goals: To be accomplished in 12 treatments:  Goal:  Pt will be able to ambulate for 10 minutes without pain to safely navigate his community.   Status at progress note -  Met   Goal:  Pt will demonstrate 5/5 L knee flexion MMT to be able to safely move furniture. Status at progress note -  Met  Goal:  Pt will report no radiating symptoms in a span of 2 days or greater to demonstrate return to PLOF.   Status at progress - Partially met (4/1/21)         Other: Discharge patient on HEP for low back. Patient will follow up with MD for hip pain.         Derek Skinner, PT 4/29/2021

## 2021-04-29 NOTE — PROGRESS NOTES
[address]  [place], [place]  Ph: [phone]     Fax: [phone]    Discharge Summary 2-15    Patient name: Lynda Dykes  : 1950  Provider#: [id number]  Referral source: Alanna Wislon MD      Medical/Treatment Diagnosis: Low back pain [M54.5]     Prior Hospitalization: see medical history     Comorbidities: See Plan of Care  Prior Level of Function: See Plan of Care  Medications: Verified on Patient Summary List    Start of Care: 2021      Onset Date:     Visits from Start of Care: 21     Missed Visits: 1  Reporting Period :   to 2021     Assessment/Summary of care: Pt is a 65year old male that presents with signs and symptoms likely consistent with low back dysfunction. Pt is a good candidate for rehab as he is a healthy and active individual who is motivated to return to his PLOF.      Pt has received treatment including lumbopelvic and lower extremity flexibility, ROM, strengthening, pelvic and core stabilization, functional activities, mechanical lumbar traction, and education in posture and body mechanics. Pt has demonstrated overall progress in pain level, functional mobility, rom, strength, and has made progress toward goals.  Pt has shown improvement in functional deficits including endurance deficits with prolonged standing and activity, as well as periodic pain with increases in activity.  However, patient is ambulating with an antalgic gait pattern due to painful hip. Advised patient to follow up with MD for hip pain. Physical Therapy goals have been met for low back pain. No further treatment is recommended from Physical Therapy at this time.        Assessment / Recommendations:      Short Term Goals: To be accomplished in 6 treatments:  Goal:  Pt will be able to stand for 30 minutes or longer with a pain rating of 5/10 or less in order to safely perform ADLs.    Status at discharge: Met  Goal:  Pt will be independent with HEP to continue rehab outside of therapy. Status at discharge: [address] be accomplished in 12 treatments:  Goal:  Pt will be able to ambulate for 10 minutes without pain to safely navigate his community. Status at discharge:-  Met   Goal:  Pt will demonstrate 5/5 L knee flexion MMT to be able to safely move furniture.   Status at discharge:  Met  Goal:  Pt will report no radiating symptoms in a span of 2 days or greater to demonstrate return to PLOF.   Status at discharge:  Partially met (4/1/21)           RECOMMENDATIONS:  [x]Discontinue therapy: [x]Patient has reached or is progressing toward set goals     []Patient is non-compliant or has abdicated     []Due to lack of appreciable progress towards set goals     []Other  Dallin Boyd, PT 4/29/2021

## 2021-04-29 NOTE — PROGRESS NOTES
PT DAILY TREATMENT NOTE - [facility] 2-15    Patient Name: Yin Blancas  Date:2021  : 1950  [x]  Patient  Verified  Payor: [place] [employer] MEDICARE / Plan: [employer] / Product Type: Managed Care Medicare /    In time:09:50 AM  Out time: 10: 38 AM  Total Treatment Time (min): 48  Total Timed Codes (min): 15  1:1 Treatment Time ( only): 15   Visit #: 21    Treatment Area: Low back pain [M54.5]    SUBJECTIVE  Pain Level (0-10 scale): 4  Any medication changes, allergies to medications, adverse drug reactions, diagnosis change, or new procedure performed?: [x] No    [] Yes (see summary sheet for update)  Subjective functional status/changes:   [] No changes reported    I have good and bad days with my hip. 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 I plan to get an appointment  with the MD to look at my hip. OBJECTIVE    Modality rationale: decrease pain and increase tissue extensibility to improve the patients ability to to stand for 30 minutes or longer with a pain rating of 5/10 or less in order to safely perform ADLs.       Min Type Additional Details      15 [x] Estim: []Att   [x]Unatt    []TENS instruct                  []IFC  [x]Premod   []NMES                     []Other:  []w/US   []w/ice   []w/heat  Position: supine with traction  Location:low back      18 [x]  Traction: [] Cervical       [x]Lumbar                       [] Prone          [x]Supine                       [x]Intermittent   []Continuous Lbs:70 lbs max and 40 lbs min. [] before manual  [] after manual  []w/heat    []  Ultrasound: []Continuous   [] Pulsed                       at: []1MHz   []3MHz Location:  W/cm2:    [] Paraffin         Location:   []w/heat    []  Ice     []  Heat  []  Ice massage Position:  Location:    []  Laser  []  Other: Position:  Location:      []  Vasopneumatic Device Pressure:       [] lo [] med [] hi   Temperature:      [x] Skin assessment post-treatment:  [x]intact []redness- no adverse reaction    []redness - adverse reaction:     15 min Therapeutic Exercise:  [x] See flow sheet :   Rationale: increase ROM and increase strength to improve the patients ability to  stand for   30 minutes or longer with a pain rating of 5/10 or less in order to safely perform ADLs.          With   [] TE   [] TA   [] Neuro   [] SC   [] other: Patient Education: [x] Review HEP    [] Progressed/Changed HEP based on:   [] positioning   [] body mechanics   [] transfers   [] heat/ice application    [] other:           Pain Level (0-10 scale) post treatment: 0    ASSESSMENT/Changes in Function:      Pt has received treatment including lumbopelvic and lower extremity flexibility, ROM, strengthening, pelvic and core stabilization, functional activities, mechanical lumbar traction, and education in posture and body mechanics. Pt has demonstrated overall progress in pain level, functional mobility, rom, strength, and has made progress toward goals.  Pt has shown improvement in functional deficits including endurance deficits with prolonged standing and activity, as well as periodic pain with increases in activity.  However, patient is ambulating with an antalgic gait pattern due to painful hip. Advised patient to follow up with MD for hip pain. Physical Therapy goals have been met for low back pain. No further treatment is recommended from Physical Therapy at this time.          []  See Plan of Care  []  See progress note/recertification  [x]  See Discharge Summary         Progress towards goals / Updated goals:  Short Term Goals: To be accomplished in 6 treatments:  Goal:  Pt will be able to stand for 30 minutes or longer with a pain rating of 5/10 or less in order to safely perform ADLs.    Status at progress note: Met  Goal:  Pt will be independent with HEP to continue rehab outside of therapy.   Status at progress note:  Met     Long Term Goals: To be accomplished in 12 treatments:  Goal:  Pt will be able to ambulate for 10 minutes without pain to safely navigate his community. Status at progress note -  Met   Goal:  Pt will demonstrate 5/5 L knee flexion MMT to be able to safely move furniture. Status at progress note -  Met  Goal:  Pt will report no radiating symptoms in a span of 2 days or greater to demonstrate return to PLOF.   Status at progress - Partially met (4/1/21)       Recommendation: Patient will be discharged on the Home exercise Program for low back and follow up with MD for hip pain.        PLAN  []  Upgrade activities as tolerated     []  Continue plan of care  []  Update interventions per flow sheet       [x]  Discharge due to: Goals Met  []  Other:_      Cat Valdez, PT 4/29/2021

## 2021-05-04 ENCOUNTER — APPOINTMENT (OUTPATIENT)
Dept: PHYSICAL THERAPY | Age: 71
End: 2021-05-04

## 2021-05-06 ENCOUNTER — APPOINTMENT (OUTPATIENT)
Dept: PHYSICAL THERAPY | Age: 71
End: 2021-05-06

## 2021-05-11 ENCOUNTER — APPOINTMENT (OUTPATIENT)
Dept: PHYSICAL THERAPY | Age: 71
End: 2021-05-11

## 2021-05-13 ENCOUNTER — APPOINTMENT (OUTPATIENT)
Dept: PHYSICAL THERAPY | Age: 71
End: 2021-05-13

## 2021-05-18 ENCOUNTER — APPOINTMENT (OUTPATIENT)
Dept: PHYSICAL THERAPY | Age: 71
End: 2021-05-18

## 2021-05-20 ENCOUNTER — APPOINTMENT (OUTPATIENT)
Dept: PHYSICAL THERAPY | Age: 71
End: 2021-05-20

## 2021-05-25 ENCOUNTER — APPOINTMENT (OUTPATIENT)
Dept: PHYSICAL THERAPY | Age: 71
End: 2021-05-25

## 2021-05-27 ENCOUNTER — APPOINTMENT (OUTPATIENT)
Dept: PHYSICAL THERAPY | Age: 71
End: 2021-05-27

## 2023-03-09 ENCOUNTER — HOSPITAL ENCOUNTER (OUTPATIENT)
Dept: PHYSICAL THERAPY | Age: 73
Discharge: HOME | End: 2023-03-09
Payer: MEDICARE

## 2023-03-09 PROCEDURE — 97110 THERAPEUTIC EXERCISES: CPT

## 2023-03-09 PROCEDURE — 97161 PT EVAL LOW COMPLEX 20 MIN: CPT

## 2023-03-09 NOTE — PROGRESS NOTES
PT INITIAL EVALUATION NOTE - [facility] 2-15    Patient Name: Ramses Garcia  Date:3/9/2023  : 1950  [x]  Patient  Verified  Payor: [employer] MEDICARE / Plan: [address] / Product Type: Managed Care Medicare /    In time: 2:08  Out time: 2:52  Total Treatment Time (min): 44  Total Timed Codes (min): 10  1:1 Treatment Time ( [address] only): 10   Visit #: 1    Treatment Area: Trochanteric bursitis, right hip [M70.61]  DDD (degenerative disc disease), lumbar [M51.36]    SUBJECTIVE  Pain Level (0-10 scale): 0/10  Any medication changes, allergies to medications, adverse drug reactions, diagnosis change, or new procedure performed?: [] No    [x] Yes (see summary sheet for update)  Subjective:    Patient is a 75 yo male who presents with R sided hip and leg pain that started in 2022. He reports that he has some pain in his lower back as well that will radiate down to his R knee. He had a hip replacement on the L side in 2022. He states that he will get pain after walking about 100 yards before he gets significant pain needing to sit down. He does not have any pain sitting or laying down. He denies any numbness or tingling. He sleeps well. He had back surgery about L3-4 laminal foraminotomies in 2021. He did have an injection in his R hip 6 weeks ago with mild relief for about 1 week. PLOF: Independent with all ADL's; no use of AD  Mechanism of Injury: none  Previous Treatment/Compliance: nothing; PT in   Radiographs: x-ray 2023: Severe loss of disc height at L5-S1 with moderate loss of disc height at L3-L4. Moderate multilevel facet arthropathy with endplate changes. What increases symptoms: walking prolonged; standing > 10 min   What decreases symptoms: laying down or sitting   Work Hx: retired   Living Situation: lives with his wife in a single story home with 4 steps to enter   Pt Goals: \" I want to get the pain better. \"  Barriers: none  Motivation: Good  Substance use: None reported  Cognition: A&O x 4  Fall Assessment: No falls risk assessment indicated at this time. Past Medical History:  Past Medical History:   Diagnosis Date    Hypercholesterolemia      Past Surgical History:  Past Surgical History:   Procedure Laterality Date    HX HERNIA REPAIR       Current Medications:  Current Outpatient Medications   Medication Instructions    glipiZIDE (GLUCOTROL) 5 mg tablet Oral, 2 TIMES DAILY    losartan (COZAAR) 25 mg tablet Oral, DAILY    metFORMIN (GLUCOPHAGE) 500 mg tablet Oral, 2 TIMES DAILY WITH MEALS    PRAVASTATIN SODIUM (PRAVASTATIN PO) Oral        Allergies:  No Known Allergies    OBJECTIVE/EXAMINATION  Gait and Functional Mobility:  normal   Palpation: tenderness over R PSIS  Sensation: normal        Lumbar AROM:      Flexion             60 degrees      Extension            20 degrees                     L                    R  Side Bending   30 degrees  30 degrees    Rotation   40 degrees  35 degrees        Manual Muscle Testing    L   R  Hip Flexion                   5/5               5/5  Knee Extension            5/5                5/5  Knee Flexion                5/5  5/5  Ankle PF   5/5  5/5  Ankle DF   5/5  5/5         Special Tests:   FABERS: NEG  H.S. SLR: NEG      SI Compression/Distraction: B+NEG   HS 90/90: 145 deg R 150 deg L   Pancho: NEG    10 min Therapeutic Exercise:  [x] See flow sheet :   Rationale: increase ROM and increase strength to improve the patients ability to ambulate with less pain.      With   [] TE   [] TA   [] neuro   [] other: Patient Education: [x] Provided HEP    [] Progressed/Changed HEP based on:   [] positioning   [] body mechanics   [] transfers   [] heat/ice application    [] other:              Pain Level (0-10 scale) post treatment: 0/10    ASSESSMENT/Changes in Function:   [x]  See Plan of [address], [place], DPT 3/9/2023

## 2023-03-09 NOTE — THERAPY EVALUATION
[address]  [place], [place]  Ph: [phone]    Fax: [phone]    Plan of Care/Statement of Necessity for Physical Therapy Services  2-15    Patient name: Timothy Jon  : 1950  Provider#: [id number]  Referral source: Jeremy Smith MD      Medical/Treatment Diagnosis: Trochanteric bursitis, right hip [M70.61]  DDD (degenerative disc disease), lumbar [M51.36]     Prior Hospitalization: see medical history     Comorbidities: see medical history  Prior Level of Function: Independent with all ADL's; no use of AD  Medications: Verified on Patient Summary List  Start of Care: 3/9/2023      Onset Date: 2022   The [address] [place] [address] and following information is based on the information from the initial evaluation. Assessment/ key information:   Patient is a 73 yo male who presents with R sided lower back pain that radiates into RLE to the knee. He only has pain with prolonged walking and standing tasks. He was point tender at the R PSIS and into R glute. He has good LE strength, but will need some core strengthening to aid in support of lumbar spine. We started a HEP to improve flexibility since he is very restricted due to HS and piriformis tightness. He will benefit from skilled PT services to improve core strength and overall flexibility to decrease pain levels during tasks.      Evaluation Complexity History LOW Complexity : Zero comorbidities / personal factors that will impact the outcome / POC; Examination LOW Complexity : 1-2 Standardized tests and measures addressing body structure, function, activity limitation and / or participation in recreation  ;Presentation LOW Complexity : Stable, uncomplicated  ;Clinical Decision Making TUG Score: n/a  Overall Complexity Rating: LOW     Problem List: pain affecting function, impaired gait/ balance, decrease ADL/ functional abilitiies, decrease activity tolerance, decrease flexibility/ joint mobility, and decrease transfer abilities   Treatment Plan may include any combination of the following: Therapeutic exercise, Neuromuscular reeducation, Manual therapy, Therapeutic activity, Self care/home management, Electric stim unattended , Vasopneumatic device, Gait training, Ultrasound, and Mechanical traction  Patient / Family readiness to learn indicated by: asking questions, trying to perform skills, and interest  Persons(s) to be included in education: patient (P)  Barriers to Learning/Limitations: None  Patient Goal (s):  \" I want to get the pain better. \"  Patient Self Reported Health Status: good  Rehabilitation Potential: good    Short Term Goals: To be accomplished in 5 treatments. Patient will be independent with HEP to improve carryover between treatment sessions. Patient will be able to stand for 15 min to make food in the kitchen without any pain. Patient will be able to walk 350 ft without pain to walk to the track at the gym for health. Long Term Goals: To be accomplished in 10 treatments. Patient will be able to walk on treadmill and/or track at the [employer] for health without pain for > 20 min. Patient will be able to stand at local functions for > 20 min without pain. Patient will be able to perform yard work without pain with flexion to  objects from the ground. Frequency / Duration: Patient to be seen 2 times per week for 10 treatments. Patient/ Caregiver education and instruction: activity modification and exercises    [x]  Plan of care has been reviewed with PTA        Certification Period: 3/9/2023 to 06/07/2023    René Suárez PT, DPT 3/9/2023     ________________________________________________________________________    I certify that the above Therapy Services are being furnished while the patient is under my care. I agree with the treatment plan and certify that this therapy is necessary.     [de-identified] Signature:____________________  Date:____________Time: _________ Corrinne Amor, MD  Please sign and return to:   [facility]  [address] [facility]        [place], [place]  Ph: [phone]    Fax: [phone]    Patient name: Rigo Dee  : 1950  Provider#: [id number]

## 2023-03-14 ENCOUNTER — HOSPITAL ENCOUNTER (OUTPATIENT)
Dept: PHYSICAL THERAPY | Age: 73
Discharge: HOME | End: 2023-03-14
Payer: MEDICARE

## 2023-03-14 PROCEDURE — 97150 GROUP THERAPEUTIC PROCEDURES: CPT

## 2023-03-14 PROCEDURE — 97110 THERAPEUTIC EXERCISES: CPT

## 2023-03-14 NOTE — PROGRESS NOTES
PT DAILY TREATMENT NOTE - [facility] 2-15    Patient Name: Jaime Jones  Date:3/14/2023  : 1950  [x]  Patient  Verified  Payor: [employer] MEDICARE / Plan: [address] / Product Type: Managed Care Medicare /    In time: 8:54  Out time: 9:41  Total Treatment Time (min): 47  Total Timed Codes (min): 34  1:1 Treatment Time ([facility] only): 34   Visit #:  2 of 10    Treatment Area: Trochanteric bursitis, right hip [M70.61]  DDD (degenerative disc disease), lumbar [M51.36]    SUBJECTIVE  Pain Level (0-10 scale): 0/10  Any medication changes, allergies to medications, adverse drug reactions, diagnosis change, or new procedure performed?: [x] No    [] Yes (see summary sheet for update)  Subjective functional status/changes:     \"I don't have any pain today. \"    OBJECTIVE    34 min Therapeutic Exercise:  [x] See flow sheet :   Rationale: increase ROM and increase strength to improve the patients ability to improve functional mobility          13 min Group Therapy:  [x] See flow sheet :   Billed while completing TE with another pt towards end of session    With   [x] TE   [] TA   [] neuro   [] other: Patient Education: [x] Review HEP    [] Progressed/Changed HEP based on:   [] positioning   [] body mechanics   [] transfers   [] heat/ice application    [] other:      Other Objective/Functional Measures: Initiated ROM and strengthening    Pain Level (0-10 scale) post treatment: 0/10    ASSESSMENT/Changes in Function: The pt tolerated treatment fairly well. No c/o increased pain today. Pt declined modalities. Patient will continue to benefit from skilled PT services to address functional mobility deficits, address ROM deficits, and address strength deficits to attain remaining goals. [x]  See Plan of Care  []  See progress note/recertification  []  See Discharge Summary         Progress towards goals / Updated goals:  Short Term Goals: To be accomplished in 5 treatments.   Patient will be independent with HEP to improve carryover between treatment sessions. Patient will be able to stand for 15 min to make food in the kitchen without any pain. Patient will be able to walk 350 ft without pain to walk to the track at the gym for health. Long Term Goals: To be accomplished in 10 treatments. Patient will be able to walk on treadmill and/or track at the gym for health without pain for > 20 min. Patient will be able to stand at local functions for > 20 min without pain. Patient will be able to perform yard work without pain with flexion to  objects from the ground.      PLAN  [x]  Upgrade activities as tolerated     [x]  Continue plan of care  []  Update interventions per flow sheet       []  Discharge due to:_  []  Other:_      Yesenia Quiros PTA, STEPHANY 3/14/2023

## 2023-03-16 ENCOUNTER — HOSPITAL ENCOUNTER (OUTPATIENT)
Dept: PHYSICAL THERAPY | Age: 73
Discharge: HOME | End: 2023-03-16
Payer: MEDICARE

## 2023-03-16 PROCEDURE — 97150 GROUP THERAPEUTIC PROCEDURES: CPT

## 2023-03-16 PROCEDURE — 97110 THERAPEUTIC EXERCISES: CPT

## 2023-03-16 NOTE — PROGRESS NOTES
PT DAILY TREATMENT NOTE 2-15    Patient Name: Arturo Sales  Date:3/16/2023  : 1950  [x]  Patient  Verified  Payor: [address] [place] / Plan: [employer] / Product Type: Managed Care Medicare /    In time:9:52  Out time:10:40  Total Treatment Time (min): 48  Visit #:  3 of 10    Treatment Area: Trochanteric bursitis, right hip [M70.61]  DDD (degenerative disc disease), lumbar [M51.36]    SUBJECTIVE  Pain Level (0-10 scale): 0/10  Any medication changes, allergies to medications, adverse drug reactions, diagnosis change, or new procedure performed?: [x] No    [] Yes (see summary sheet for update)  Subjective functional status/changes:     \"I am doing good. \"    OBJECTIVE    40 min Therapeutic Exercise:  [x] See flow sheet :   Rationale: increase ROM, increase strength, and improve coordination to improve the patients ability to perform daily tasks without irritation. PT DAILY TREATMENT NOTE - [facility] 2-15          8 min Group Therapy:  [x] See flow sheet :   Billed while completing exercises while therapist was assisting another patient. With   [x] TE   [] TA   [] neuro   [] other: Patient Education: [x] Review HEP    [] Progressed/Changed HEP based on:   [] positioning   [] body mechanics   [] transfers   [] heat/ice application    [] other:      Other Objective/Functional Measures: sit to stand     Pain Level (0-10 scale) post treatment: 0/10    ASSESSMENT/Changes in Function:   Patient tolerated treatment well today. He is tolerating all exercises well. He does struggle a little with maintaining a neurtral core with bird dogs. We added in sit to stand and will progress more at the next session.  Patient will continue to benefit from skilled PT services to modify and progress therapeutic interventions, address functional mobility deficits, address ROM deficits, address strength deficits, analyze and address soft tissue restrictions, and analyze and cue movement patterns to attain remaining goals. [x]  See Plan of Care  []  See progress note/recertification  []  See Discharge Summary         Progress towards goals / Updated goals:  Short Term Goals: To be accomplished in 5 treatments. Patient will be independent with HEP to improve carryover between treatment sessions. Patient will be able to stand for 15 min to make food in the kitchen without any pain. Patient will be able to walk 350 ft without pain to walk to the track at the gym for health. Long Term Goals: To be accomplished in 10 treatments. Patient will be able to walk on treadmill and/or track at the [employer] for health without pain for > 20 min. Patient will be able to stand at local functions for > 20 min without pain. Patient will be able to perform yard work without pain with flexion to  objects from the ground.      PLAN  [x]  Upgrade activities as tolerated     [x]  Continue plan of care  []  Update interventions per flow sheet       []  Discharge due to:_  []  Other:_      Meryle Alken, PT, DPT 3/16/2023

## 2023-03-21 ENCOUNTER — HOSPITAL ENCOUNTER (OUTPATIENT)
Dept: PHYSICAL THERAPY | Age: 73
Discharge: HOME | End: 2023-03-21
Payer: MEDICARE

## 2023-03-21 PROCEDURE — 97110 THERAPEUTIC EXERCISES: CPT

## 2023-03-21 NOTE — PROGRESS NOTES
PT DAILY TREATMENT NOTE - [facility] 2-15    Patient Name: Mary Jane Harris  Date:3/21/2023  : 1950  [x]  Patient  Verified  Payor: [employer] MEDICARE / Plan: [employer] [address] / Product Type: Managed Care Medicare /    In time:10:00  Out time:10:52  Total Treatment Time (min): 52  Total Timed Codes (min): 52  1:1 Treatment Time ( [address] only): 46   Visit #:  4 of 10    Treatment Area: Trochanteric bursitis, right hip [M70.61]  DDD (degenerative disc disease), lumbar [M51.36]    SUBJECTIVE  Pain Level (0-10 scale): 0/10  Any medication changes, allergies to medications, adverse drug reactions, diagnosis change, or new procedure performed?: [x] No    [] Yes (see summary sheet for update)  Subjective functional status/changes:     \"I just still hurt only when walking long distances. \"    OBJECTIVE    52 min Therapeutic Exercise:  [x] See flow sheet :   Rationale: increase ROM, increase strength, improve coordination, and improve balance to improve the patients ability to perform daily tasks with less irritation in the lower back. With   [x] TE   [] TA   [] neuro   [] other: Patient Education: [x] Review HEP    [] Progressed/Changed HEP based on:   [] positioning   [] body mechanics   [] transfers   [] heat/ice application    [] other:      Other Objective/Functional Measures: dead bug    Pain Level (0-10 scale) post treatment: 0/10    ASSESSMENT/Changes in Function:   The pt tolerated treatment well. He is gaining some strength tone in the core. We added in dead bugs which he tolerated well. We will continue to progress and build some endurance in his lower back for walking tasks. Patient will continue to benefit from skilled PT services to modify and progress therapeutic interventions, address functional mobility deficits, address ROM deficits, address strength deficits, analyze and address soft tissue restrictions, and analyze and cue movement patterns to attain remaining goals. [x]  See Plan of Care  []  See progress note/recertification  []  See Discharge Summary         Progress towards goals / Updated goals:  Short Term Goals: To be accomplished in 5 treatments. Patient will be independent with HEP to improve carryover between treatment sessions. Patient will be able to stand for 15 min to make food in the kitchen without any pain. Patient will be able to walk 350 ft without pain to walk to the track at the gym for health. Long Term Goals: To be accomplished in 10 treatments. Patient will be able to walk on treadmill and/or track at the gym for health without pain for > 20 min. Patient will be able to stand at local functions for > 20 min without pain. Patient will be able to perform yard work without pain with flexion to  objects from the ground.      PLAN  [x]  Upgrade activities as tolerated     [x]  Continue plan of care  []  Update interventions per flow sheet       []  Discharge due to:_  []  Other:_      Thelma Dhaliwal, PT, DPT 3/21/2023

## 2023-03-23 ENCOUNTER — HOSPITAL ENCOUNTER (OUTPATIENT)
Dept: PHYSICAL THERAPY | Age: 73
Discharge: HOME | End: 2023-03-23
Payer: MEDICARE

## 2023-03-23 PROCEDURE — 97110 THERAPEUTIC EXERCISES: CPT

## 2023-03-23 PROCEDURE — 97012 MECHANICAL TRACTION THERAPY: CPT

## 2023-03-23 NOTE — PROGRESS NOTES
PT DAILY TREATMENT NOTE - [facility] 2-15    Patient Name: Ankit Po  Date:3/23/2023  : 1950  [x]  Patient  Verified  Payor: [place] MEDICARE / Plan: [place] / Product Type: Managed Care Medicare /    In time:1000 am  Out time:1057  Total Treatment Time (min): 57  Total Timed Codes (min): 39  1:1 Treatment Time ( [address] only): 44   Visit #:  5 of 10/ (30 days)    Treatment Area: Trochanteric bursitis, right hip [M70.61]  DDD (degenerative disc disease), lumbar [M51.36]    SUBJECTIVE  Pain Level (0-10 scale): 0/10  Any medication changes, allergies to medications, adverse drug reactions, diagnosis change, or new procedure performed?: [x] No    [] Yes (see summary sheet for update)  Subjective functional status/changes: \"Pt reports no pain after sitting for a while or with shorter distance walking, however if he continues to walk any increased amount of time or distance and the pain starts he needs to sit and rest in order to complete his walk. \"    OBJECTIVE  Modality rationale: increase tissue extensibility and increase muscle contraction/control to improve the patients ability to increase functional    Min Type Additional Details    [] Estim: []Att   []Unatt        []TENS instruct                  []IFC  []Premod   []NMES                     []Other:  []w/US   []w/ice   []w/heat  Position:  Location:   18 [x]  Traction: [] Cervical       [x]Lumbar                       [] Prone          [x]Supine                       [x]Intermittent   []Continuous Lbs:45/20  [] before manual  [] after manual  [x] With heat  [] Simultaneously performed with E-Stim    []  Ultrasound: []Continuous   [] Pulsed                           []1MHz   []3MHz Location:  W/cm2:    []  Ice     []  heat  []  Ice massage Position:  Location:    []  Vasopneumatic Device  If using vaso (only need to measure limb vaso being performed on)      pre-treatment girth :       post-treatment girth : measured at (landmark location)  Pressure:       [] lo [] med [] hi   Temperature: [] lo [] med [] Hi    [] With ice    [] Simultaneously performed with Estim   [x] Skin assessment post-treatment:  [x]intact [x]redness- no adverse reaction       []redness - adverse reaction:     39 min Therapeutic Exercise:  [x] See flow sheet :   Rationale: increase ROM, increase strength, improve coordination, and improve balance to improve the patients ability to increase functional activity level and reduce radicular pain and weakness         With   [x] TE   [] TA   [] neuro   [] other: Patient Education: [x] Review HEP    [] Progressed/Changed HEP based on:   [] positioning   [] body mechanics   [] transfers   [] heat/ice application    [] other:      Pain Level (0-10 scale) post treatment: 0/10    ASSESSMENT/Changes in Function:   The pt tolerated treatment working on stretching and increase strengthening for core mm. Pt willing to try traction at this time to help reduce pressure and radicular pain in R leg. Reviewed HEP and way to correct posture during amb to try to reduce increase in pain during gait. Patient will continue to benefit from skilled PT services to modify and progress therapeutic interventions, address functional mobility deficits, address ROM deficits, address strength deficits, and analyze and address soft tissue restrictions to attain remaining goals. [x]  See Plan of Care  []  See progress note/recertification  []  See Discharge Summary         Progress towards goals / Updated goals:  Short Term Goals: To be accomplished in 5 treatments. Patient will be independent with HEP to improve carryover between treatment sessions. Patient will be able to stand for 15 min to make food in the kitchen without any pain. Patient will be able to walk 350 ft without pain to walk to the track at the gym for health. Long Term Goals: To be accomplished in 10 treatments.   Patient will be able to walk on treadmill and/or track at the gym for health without pain for > 20 min. Patient will be able to stand at local functions for > 20 min without pain. Patient will be able to perform yard work without pain with flexion to  objects from the ground.     PLAN  [x]  Upgrade activities as tolerated     [x]  Continue plan of care  []  Update interventions per flow sheet       []  Discharge due to:_  []  Other:_      Kate Crockett PTA, STEPHANY 3/23/2023

## 2023-03-28 ENCOUNTER — APPOINTMENT (OUTPATIENT)
Dept: PHYSICAL THERAPY | Age: 73
End: 2023-03-28
Payer: MEDICARE

## 2023-03-30 ENCOUNTER — APPOINTMENT (OUTPATIENT)
Dept: PHYSICAL THERAPY | Age: 73
End: 2023-03-30
Payer: MEDICARE

## 2023-05-24 RX ORDER — GLIPIZIDE 5 MG/1
TABLET ORAL 2 TIMES DAILY
COMMUNITY

## 2023-05-24 RX ORDER — LOSARTAN POTASSIUM 25 MG/1
TABLET ORAL DAILY
COMMUNITY

## 2024-03-04 ENCOUNTER — HOSPITAL ENCOUNTER (EMERGENCY)
Facility: HOSPITAL | Age: 74
Discharge: HOME | End: 2024-03-04
Attending: EMERGENCY MEDICINE
Payer: MEDICARE

## 2024-03-04 VITALS
TEMPERATURE: 97.9 F | RESPIRATION RATE: 18 BRPM | BODY MASS INDEX: 25.66 KG/M2 | OXYGEN SATURATION: 98 % | HEIGHT: 65 IN | DIASTOLIC BLOOD PRESSURE: 66 MMHG | WEIGHT: 154 LBS | SYSTOLIC BLOOD PRESSURE: 119 MMHG | HEART RATE: 60 BPM

## 2024-03-04 DIAGNOSIS — L03.317 ABSCESS AND CELLULITIS OF GLUTEAL REGION: Primary | ICD-10-CM

## 2024-03-04 DIAGNOSIS — L02.31 ABSCESS AND CELLULITIS OF GLUTEAL REGION: Primary | ICD-10-CM

## 2024-03-04 PROCEDURE — 2500000003 HC RX 250 WO HCPCS: Performed by: EMERGENCY MEDICINE

## 2024-03-04 PROCEDURE — 99284 EMERGENCY DEPT VISIT MOD MDM: CPT

## 2024-03-04 PROCEDURE — 6370000000 HC RX 637 (ALT 250 FOR IP): Performed by: EMERGENCY MEDICINE

## 2024-03-04 PROCEDURE — 6360000002 HC RX W HCPCS: Performed by: EMERGENCY MEDICINE

## 2024-03-04 PROCEDURE — 10061 I&D ABSCESS COMP/MULTIPLE: CPT

## 2024-03-04 PROCEDURE — 96372 THER/PROPH/DIAG INJ SC/IM: CPT

## 2024-03-04 RX ORDER — IBUPROFEN 600 MG/1
600 TABLET ORAL 4 TIMES DAILY PRN
Qty: 20 TABLET | Refills: 0 | Status: SHIPPED | OUTPATIENT
Start: 2024-03-04 | End: 2024-03-09

## 2024-03-04 RX ORDER — CEPHALEXIN 500 MG/1
500 CAPSULE ORAL 4 TIMES DAILY
Qty: 28 CAPSULE | Refills: 0 | Status: SHIPPED | OUTPATIENT
Start: 2024-03-04 | End: 2024-03-11

## 2024-03-04 RX ORDER — HYDROCODONE BITARTRATE AND ACETAMINOPHEN 5; 325 MG/1; MG/1
1 TABLET ORAL
Status: COMPLETED | OUTPATIENT
Start: 2024-03-04 | End: 2024-03-04

## 2024-03-04 RX ORDER — LIDOCAINE HYDROCHLORIDE AND EPINEPHRINE BITARTRATE 20; .01 MG/ML; MG/ML
20 INJECTION, SOLUTION SUBCUTANEOUS
Status: COMPLETED | OUTPATIENT
Start: 2024-03-04 | End: 2024-03-04

## 2024-03-04 RX ORDER — IBUPROFEN 800 MG/1
800 TABLET ORAL
Status: COMPLETED | OUTPATIENT
Start: 2024-03-04 | End: 2024-03-04

## 2024-03-04 RX ADMIN — LIDOCAINE HYDROCHLORIDE,EPINEPHRINE BITARTRATE 20 ML: 20; .01 INJECTION, SOLUTION INFILTRATION; PERINEURAL at 15:02

## 2024-03-04 RX ADMIN — HYDROCODONE BITARTRATE AND ACETAMINOPHEN 1 TABLET: 5; 325 TABLET ORAL at 16:51

## 2024-03-04 RX ADMIN — IBUPROFEN 800 MG: 800 TABLET, FILM COATED ORAL at 16:51

## 2024-03-04 RX ADMIN — LIDOCAINE HYDROCHLORIDE 1000 MG: 10 INJECTION, SOLUTION EPIDURAL; INFILTRATION; INTRACAUDAL; PERINEURAL at 16:51

## 2024-03-04 ASSESSMENT — PAIN - FUNCTIONAL ASSESSMENT: PAIN_FUNCTIONAL_ASSESSMENT: 0-10

## 2024-03-04 ASSESSMENT — PAIN SCALES - GENERAL
PAINLEVEL_OUTOF10: 2
PAINLEVEL_OUTOF10: 4

## 2024-03-04 NOTE — ED PROVIDER NOTES
[facility] EMERGENCY DEPT  EMERGENCY DEPARTMENT HISTORY AND PHYSICAL EXAM      Date: 3/4/2024  Patient Name: Carley Suarez  MRN: [id number]  YOB: 1950  Date of evaluation: 3/4/2024  Provider: Madelyn Suárez MD   Note Started: 2:54 PM EST 3/4/24    HISTORY OF PRESENT ILLNESS     Chief Complaint   Patient presents with    Abscess       History Provided By: Patient    HPI: Carley Suarez is a 74 y.o. male     PAST MEDICAL HISTORY   Past Medical History:  Past Medical History:   Diagnosis Date    Hypercholesterolemia        Past Surgical History:  Past Surgical History:   Procedure Laterality Date    HERNIA REPAIR         Family History:  History reviewed. No pertinent family history.    Social History:  Social History     Tobacco Use    Smoking status: Never   Substance Use Topics    Alcohol use: Yes     Alcohol/week: 0.0 standard drinks of alcohol    Drug use: No       Allergies:  No Known Allergies    PCP: Sp Neal MD    Current Meds:   No current facility-administered medications for this encounter.     Current Outpatient Medications   Medication Sig Dispense Refill    glipiZIDE (GLUCOTROL) 5 MG tablet Take by mouth 2 times daily      losartan (COZAAR) 25 MG tablet Take by mouth daily      metFORMIN (GLUCOPHAGE) 500 MG tablet Take by mouth 2 times daily (with meals)         Social Determinants of Health:   Social Determinants of Health     Tobacco Use: Unknown (3/4/2024)    Patient History     Smoking Tobacco Use: Never     Smokeless Tobacco Use: Unknown     Passive Exposure: Not on file   Alcohol Use: Not on file   Financial Resource Strain: Not on file   Food Insecurity: Not on file   Transportation Needs: Not on file   Physical Activity: Not on file   Stress: Not on file   Social Connections: Not on file   Intimate Partner Violence: Not on file   Depression: Not on file   Housing Stability: Not on file   Interpersonal Safety: Not on file   Utilities: Not on file       PHYSICAL EXAM   Physical

## 2024-03-04 NOTE — ED TRIAGE NOTES
Pt reported abscess on the left buttock for 7 days pt seen by pcp and recommended he be seen in ED for I&D pt is 2 weeks post lumbar fusion

## 2024-03-06 ENCOUNTER — HOSPITAL ENCOUNTER (EMERGENCY)
Facility: HOSPITAL | Age: 74
Discharge: HOME | End: 2024-03-06
Attending: EMERGENCY MEDICINE
Payer: MEDICARE

## 2024-03-06 VITALS
SYSTOLIC BLOOD PRESSURE: 126 MMHG | RESPIRATION RATE: 16 BRPM | HEART RATE: 77 BPM | BODY MASS INDEX: 25.66 KG/M2 | TEMPERATURE: 98.4 F | HEIGHT: 65 IN | DIASTOLIC BLOOD PRESSURE: 62 MMHG | WEIGHT: 154 LBS | OXYGEN SATURATION: 97 %

## 2024-03-06 DIAGNOSIS — Z51.89 WOUND CHECK, ABSCESS: Primary | ICD-10-CM

## 2024-03-06 PROCEDURE — 99282 EMERGENCY DEPT VISIT SF MDM: CPT

## 2024-03-06 ASSESSMENT — LIFESTYLE VARIABLES
HOW OFTEN DO YOU HAVE A DRINK CONTAINING ALCOHOL: NEVER
HOW MANY STANDARD DRINKS CONTAINING ALCOHOL DO YOU HAVE ON A TYPICAL DAY: PATIENT DOES NOT DRINK

## 2024-03-06 NOTE — ED PROVIDER NOTES
DISCONTINUED MEDICATIONS:  Current Discharge Medication List          I am the Primary Clinician of Record. Quentin Diaz MD (electronically signed)    (Please note that parts of this dictation were completed with voice recognition software. Quite often unanticipated grammatical, syntax, homophones, and other interpretive errors are inadvertently transcribed by the computer software. Please disregards these errors. Please excuse any errors that have escaped final proofreading.)      Quentin Diaz MD  03/06/24 0915

## 2024-03-06 NOTE — ED TRIAGE NOTES
Patient was seen 2 days ago for a sacral wound that was lanced and packed was told to return for packing change this morning patient reports no pain and is doing well with wound